# Patient Record
Sex: MALE | Race: ASIAN | NOT HISPANIC OR LATINO | URBAN - METROPOLITAN AREA
[De-identification: names, ages, dates, MRNs, and addresses within clinical notes are randomized per-mention and may not be internally consistent; named-entity substitution may affect disease eponyms.]

---

## 2020-09-19 ENCOUNTER — INPATIENT (INPATIENT)
Facility: HOSPITAL | Age: 75
LOS: 3 days | Discharge: HOME | End: 2020-09-23
Attending: STUDENT IN AN ORGANIZED HEALTH CARE EDUCATION/TRAINING PROGRAM | Admitting: STUDENT IN AN ORGANIZED HEALTH CARE EDUCATION/TRAINING PROGRAM
Payer: MEDICARE

## 2020-09-19 VITALS
TEMPERATURE: 98 F | OXYGEN SATURATION: 100 % | DIASTOLIC BLOOD PRESSURE: 117 MMHG | HEART RATE: 72 BPM | RESPIRATION RATE: 16 BRPM | SYSTOLIC BLOOD PRESSURE: 247 MMHG

## 2020-09-19 DIAGNOSIS — H54.62 UNQUALIFIED VISUAL LOSS, LEFT EYE, NORMAL VISION RIGHT EYE: ICD-10-CM

## 2020-09-19 LAB
ALBUMIN SERPL ELPH-MCNC: 4.7 G/DL — SIGNIFICANT CHANGE UP (ref 3.3–5)
ALP SERPL-CCNC: 72 U/L — SIGNIFICANT CHANGE UP (ref 40–120)
ALT FLD-CCNC: 14 U/L — SIGNIFICANT CHANGE UP (ref 4–41)
ANION GAP SERPL CALC-SCNC: 15 MMO/L — HIGH (ref 7–14)
APTT BLD: 32.7 SEC — SIGNIFICANT CHANGE UP (ref 27–36.3)
AST SERPL-CCNC: 27 U/L — SIGNIFICANT CHANGE UP (ref 4–40)
BASOPHILS # BLD AUTO: 0.09 K/UL — SIGNIFICANT CHANGE UP (ref 0–0.2)
BASOPHILS NFR BLD AUTO: 1 % — SIGNIFICANT CHANGE UP (ref 0–2)
BILIRUB SERPL-MCNC: 0.6 MG/DL — SIGNIFICANT CHANGE UP (ref 0.2–1.2)
BUN SERPL-MCNC: 21 MG/DL — SIGNIFICANT CHANGE UP (ref 7–23)
CALCIUM SERPL-MCNC: 9.9 MG/DL — SIGNIFICANT CHANGE UP (ref 8.4–10.5)
CHLORIDE SERPL-SCNC: 101 MMOL/L — SIGNIFICANT CHANGE UP (ref 98–107)
CO2 SERPL-SCNC: 24 MMOL/L — SIGNIFICANT CHANGE UP (ref 22–31)
CREAT SERPL-MCNC: 1.13 MG/DL — SIGNIFICANT CHANGE UP (ref 0.5–1.3)
CRP SERPL-MCNC: < 4 MG/L — SIGNIFICANT CHANGE UP
EOSINOPHIL # BLD AUTO: 0.07 K/UL — SIGNIFICANT CHANGE UP (ref 0–0.5)
EOSINOPHIL NFR BLD AUTO: 0.7 % — SIGNIFICANT CHANGE UP (ref 0–6)
ERYTHROCYTE [SEDIMENTATION RATE] IN BLOOD: 6 MM/HR — SIGNIFICANT CHANGE UP (ref 1–15)
GLUCOSE SERPL-MCNC: 172 MG/DL — HIGH (ref 70–99)
HCT VFR BLD CALC: 44.2 % — SIGNIFICANT CHANGE UP (ref 39–50)
HGB BLD-MCNC: 14.2 G/DL — SIGNIFICANT CHANGE UP (ref 13–17)
IMM GRANULOCYTES NFR BLD AUTO: 0.4 % — SIGNIFICANT CHANGE UP (ref 0–1.5)
INR BLD: 1.08 — SIGNIFICANT CHANGE UP (ref 0.88–1.16)
LYMPHOCYTES # BLD AUTO: 2.96 K/UL — SIGNIFICANT CHANGE UP (ref 1–3.3)
LYMPHOCYTES # BLD AUTO: 31.3 % — SIGNIFICANT CHANGE UP (ref 13–44)
MCHC RBC-ENTMCNC: 27.5 PG — SIGNIFICANT CHANGE UP (ref 27–34)
MCHC RBC-ENTMCNC: 32.1 % — SIGNIFICANT CHANGE UP (ref 32–36)
MCV RBC AUTO: 85.7 FL — SIGNIFICANT CHANGE UP (ref 80–100)
MONOCYTES # BLD AUTO: 0.88 K/UL — SIGNIFICANT CHANGE UP (ref 0–0.9)
MONOCYTES NFR BLD AUTO: 9.3 % — SIGNIFICANT CHANGE UP (ref 2–14)
NEUTROPHILS # BLD AUTO: 5.43 K/UL — SIGNIFICANT CHANGE UP (ref 1.8–7.4)
NEUTROPHILS NFR BLD AUTO: 57.3 % — SIGNIFICANT CHANGE UP (ref 43–77)
NRBC # FLD: 0 K/UL — SIGNIFICANT CHANGE UP (ref 0–0)
PLATELET # BLD AUTO: 260 K/UL — SIGNIFICANT CHANGE UP (ref 150–400)
PMV BLD: 10.4 FL — SIGNIFICANT CHANGE UP (ref 7–13)
POTASSIUM SERPL-MCNC: 4 MMOL/L — SIGNIFICANT CHANGE UP (ref 3.5–5.3)
POTASSIUM SERPL-SCNC: 4 MMOL/L — SIGNIFICANT CHANGE UP (ref 3.5–5.3)
PROT SERPL-MCNC: 7.7 G/DL — SIGNIFICANT CHANGE UP (ref 6–8.3)
PROTHROM AB SERPL-ACNC: 12.3 SEC — SIGNIFICANT CHANGE UP (ref 10.6–13.6)
RBC # BLD: 5.16 M/UL — SIGNIFICANT CHANGE UP (ref 4.2–5.8)
RBC # FLD: 13.4 % — SIGNIFICANT CHANGE UP (ref 10.3–14.5)
SODIUM SERPL-SCNC: 140 MMOL/L — SIGNIFICANT CHANGE UP (ref 135–145)
WBC # BLD: 9.47 K/UL — SIGNIFICANT CHANGE UP (ref 3.8–10.5)
WBC # FLD AUTO: 9.47 K/UL — SIGNIFICANT CHANGE UP (ref 3.8–10.5)

## 2020-09-19 PROCEDURE — 70496 CT ANGIOGRAPHY HEAD: CPT | Mod: 26

## 2020-09-19 PROCEDURE — 70498 CT ANGIOGRAPHY NECK: CPT | Mod: 26

## 2020-09-19 PROCEDURE — 99285 EMERGENCY DEPT VISIT HI MDM: CPT

## 2020-09-19 RX ORDER — LABETALOL HCL 100 MG
100 TABLET ORAL ONCE
Refills: 0 | Status: COMPLETED | OUTPATIENT
Start: 2020-09-19 | End: 2020-09-19

## 2020-09-19 RX ORDER — LABETALOL HCL 100 MG
10 TABLET ORAL ONCE
Refills: 0 | Status: COMPLETED | OUTPATIENT
Start: 2020-09-19 | End: 2020-09-19

## 2020-09-19 RX ADMIN — Medication 100 MILLIGRAM(S): at 18:06

## 2020-09-19 RX ADMIN — Medication 10 MILLIGRAM(S): at 17:15

## 2020-09-19 NOTE — CONSULT NOTE ADULT - ASSESSMENT
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    1. CRAO Left Eye  -Patient presented with painless unilateral acute vision loss seen to have ischemic posterior pole with cherry red spot  -BP noted to be very elevated (systolic ~220s/100s) in the ED.  -ESR, CRP, plt ruled out GCA. Pt has no GCA Sx (no fever, joint pain, temporal scalp sensitivity, jaw claudication, new weight loss, joint pain).  -Stroke work-up: A1c, duplex Doppler US, echo. Monitor on telemetry.  -Neurology on board. MRI brain without contrast ordered. ASA 81 mg PO daily started.     Outpatient Follow-up: Patient should follow-up with his/her ophthalmologist or with [facility] of Ophthalmology within 1 week of after discharge at:    [address]  [place] [address]  [phone]    Jesus Tony MD, PGY-2  Pager: [phone]  Also available on [employer] Teams      Seen and discussed with Dr. Tk Cardona, PGY4. 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    1. CRAO Left Eye  -Patient presented with painless unilateral acute vision loss seen to have ischemic posterior pole with cherry red spot  -BP noted to be very elevated (systolic ~220s/100s) in the ED.  -ESR, CRP, plt ruled out GCA. Pt has no GCA Sx (no fever, joint pain, temporal scalp sensitivity, jaw claudication, new weight loss, joint pain).  -Stroke work-up: A1c, duplex Doppler US, echo. Monitor on telemetry.  -Neurology on board. MRI brain without contrast ordered. ASA 81 mg PO daily started.     2. Glaucoma Both Eyes  -Patient noted with large cup to disc ratios in both optic nerves (0.9 right, 0.8 left) likely indicating severe glaucoma in both eyes, although intraocular pressures were normal.  -Patient told to follow up with ophthalmologist after discharge from hospital for further work-up.    Outpatient Follow-up: Patient should follow-up with his/her ophthalmologist or with [facility] Department of Ophthalmology within 1 week of after discharge at:    [address]  [place] [address]  [phone]    Jesus Tony MD, PGY-2  Pager: [phone]  Also available on [employer] Teams      Seen and discussed with Dr. Tk Cardona, PGY4.

## 2020-09-19 NOTE — CONSULT NOTE ADULT - ATTENDING COMMENTS
agree with above.  2 day hx of vision loss on arrival. seen by ophthalmology c/w  CRAO     seen and examined with team. exam c/w central vision loss b/l   ct cta without acute process  mri pending   risk factor modification   pt/ot eval   denies any visual  hallucinations.   tele monitoring to r/o embolic source  f/u with ophtho on discharge

## 2020-09-19 NOTE — CONSULT NOTE ADULT - ASSESSMENT
Assessment: 75 year old male, PMH DM, HTN, R eye partial vision loss, coming in for new-onset L eye vision loss. According to the patient, he was watching TV 2 days ago Thursday 9/17 at approximately 8pm. During this time, he suddenly lost sight in his L eye. He states he is able to make out subtle shapes while denying complete vision loss. The L eye vision loss is described painless. Patient did not go to his regular ophthalmologist until today because he was not able to get an appointment sooner. The ophthalmologist saw findings suspicious for a CRAO and advised the patient to go to the ED for a full stroke workup. Patient denies HA, trouble swallowing/speech, numbness/tingling/weakness, balance abnormalities. Exam revealed decreased VF in both eyes but was able to make out the my fingers when held close to his L eye. Pupils were dilated 5-6mm, though likely from dilation from ophtho workup.     Impression: Sudden L eye vision loss may be 2/2 CRAO, which was of strong suspicion from his outside ophthalmologist. Ophtho has been consulted and will follow their recs, while at the same time conduct a full stroke workup, while inpatient, to rule out acute infarct as another possible etiology.      Plan:  []MRI brain w/o contrast  []Can start ASA 81mg PO daily  []Telemetry monitoring  []TTE  []Further workup pending MRI brain results  []Appreciate ophthalmology recs       -Management & disposition discussed with neuro attending, Dr. Avila Assessment: 75 year old male, PMH DM, HTN, R eye partial vision loss, coming in for new-onset L eye vision loss. According to the patient, he was watching TV 2 days ago Thursday 9/17 at approximately 8pm. During this time, he suddenly lost sight in his L eye. He states he is able to make out subtle shapes while denying complete vision loss. The L eye vision loss is described painless. Patient did not go to his regular ophthalmologist until today because he was not able to get an appointment sooner. The ophthalmologist saw findings suspicious for a CRAO and advised the patient to go to the ED for a full stroke workup. Patient denies HA, trouble swallowing/speech, numbness/tingling/weakness, balance abnormalities. Exam revealed decreased VF in both eyes but was able to make out the my fingers when held close to his L eye. Pupils were dilated 5-6mm, though likely from dilation from ophtho workup.     Impression: Sudden L eye vision loss may be 2/2 CRAO, which was of strong suspicion from his outside ophthalmologist according to the ED. Ophtho has been consulted and will follow their recs, while at the same time conduct a full stroke workup, while inpatient, to rule out acute infarct as another possible etiology.      Plan:  []MRI brain w/o contrast  []Can start ASA 81mg PO daily  []Telemetry monitoring  []TTE  []Further workup pending MRI brain results  []Appreciate ophthalmology recs       -Management & disposition discussed with neuro attending, Dr. Avila Assessment: 75 year old male, PMH DM, HTN, 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 According to the patient, he was watching TV 2 days ago Thursday 9/17 at approximately 8pm. During this time, he suddenly lost sight in his L eye. He states he is able to make out subtle shapes while denying complete vision loss. The L eye vision loss is described painless. Patient did not go to his regular ophthalmologist until today because he was not able to get an appointment sooner. The ophthalmologist saw findings suspicious for a CRAO and advised the patient to go to the ED for a full stroke workup. Patient denies HA, trouble swallowing/speech, numbness/tingling/weakness, balance abnormalities. Exam revealed decreased VF in both eyes but was able to make out the my fingers when held close to his L eye. Pupils were dilated 5-6mm, though likely from dilation from ophtho workup.     Impression: Sudden L eye vision loss may be 2/2 CRAO, which was of strong suspicion from his outside ophthalmologist according to the ED. Ophtho has been consulted and will follow their recs, while at the same time conduct a full stroke workup, while inpatient, to rule out acute infarct as another possible etiology.      Plan:  []MRI brain w/o contrast  []Can start ASA 81mg PO daily  []Telemetry monitoring  []TTE  []Further workup pending MRI brain results  []Appreciate ophthalmology recs       -Management & disposition discussed with neuro attending, Dr. Avila Assessment: 75 year old male, PMH DM, HTN, 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 According to the patient, he was watching TV 2 days ago Thursday 9/17 at approximately 8pm. During this time, he suddenly lost sight in his L eye. He states he is able to make out subtle shapes while denying complete vision loss. The L eye vision loss is described painless. Patient did not go to his regular ophthalmologist until today because he was not able to get an appointment sooner. The ophthalmologist saw findings suspicious for a CRAO and advised the patient to go to the ED for a full stroke workup. Patient denies HA, trouble swallowing/speech, numbness/tingling/weakness, balance abnormalities. Exam revealed decreased VF in both eyes but was able to make out the my fingers when held close to his L eye. Pupils were dilated 5-6mm, though likely from dilation from ophtho workup. CT imaging negative for bleed, mass effect, acute infarct, vessel occlusion/stenosis.    Impression: Sudden L eye vision loss may be 2/2 CRAO, which was of strong suspicion from his outside ophthalmologist according to the ED. Ophtho has been consulted and will follow their recs, while at the same time conduct a full stroke workup, while inpatient, to rule out acute infarct as another possible etiology.      Plan:  []MRI brain w/o contrast  []Can start ASA 81mg PO daily  []Telemetry monitoring  []TTE  []Further workup pending MRI brain results  []Appreciate ophthalmology recs       -Management & disposition discussed with neuro attending, Dr. Avila Assessment: 75 year old male, PMH DM, HTN, 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 According to the patient, he was watching TV 2 days ago Thursday 9/17 at approximately 8pm. During this time, he suddenly lost sight in his L eye. He states he is able to make out subtle shapes while denying complete vision loss. The L eye vision loss is described painless. Patient did not go to his regular ophthalmologist until today because he was not able to get an appointment sooner. The ophthalmologist saw findings suspicious for a CRAO and advised the patient to go to the ED for a full stroke workup. Patient denies HA, trouble swallowing/speech, numbness/tingling/weakness, balance abnormalities. Exam revealed decreased VF in both eyes but was able to make out the my fingers when held close to his L eye. Pupils were dilated 5-6mm, though likely from dilation from ophtho workup. CT imaging negative for bleed, mass effect, acute infarct, vessel occlusion/stenosis.    Impression: Sudden L eye vision loss likely 2/2 CRAO per optho, which was of strong suspicion from his outside ophthalmologist according to the ED. Ophtho has been consulted and will follow their recs, while at the same time conduct a full stroke workup, while inpatient, to rule out acute infarct as another possible etiology.      Plan:  []MRI brain w/o contrast  []Can start ASA 81mg PO daily  []Telemetry monitoring  []TTE  []Further workup pending MRI brain results  []Appreciate ophthalmology recs   [] lipid panel consider lipitor 40 mg       -Management & disposition discussed with neuro attending, Dr. Avila

## 2020-09-19 NOTE — CONSULT NOTE ADULT - SUBJECTIVE AND OBJECTIVE BOX
HPI: 75 year old male, PMH DM, HTN, R eye partial vision loss, coming in for new-onset L eye vision loss. According to the patient, he was watching TV 2 days ago Thursday 9/17 at approximately 8pm. During this time, he suddenly lost sight in his L eye. He states he is able to make out subtle shapes while denying complete vision loss. The L eye vision loss is described painless. Patient did not go to his regular ophthalmologist until today because he was not able to get an appointment sooner. The ophthalmologist saw findings suspicious for a CRAO and advised the patient to go to the ED for a full stroke workup. Patient denies HA, trouble swallowing/speech, numbness/tingling/weakness, balance abnormalities.     (Stroke only)  NIHSS: 3   pre-MRS: 0    REVIEW OF SYSTEMS  As per HPI    PAST MEDICAL & SURGICAL HISTORY:  DM2 (diabetes mellitus, type 2)    HTN (hypertension)    No significant past surgical history      FAMILY HISTORY:    SOCIAL HISTORY:   T/E/D:   Occupation:   Lives with:     MEDICATIONS (HOME):  Home Medications:    MEDICATIONS  (STANDING):    MEDICATIONS  (PRN):    ALLERGIES/INTOLERANCES:  Allergies  No Known Allergies    Intolerances    VITALS & EXAMINATION:  Vital Signs Last 24 Hrs  T(C): 36.8 (19 Sep 2020 19:18), Max: 36.8 (19 Sep 2020 19:18)  T(F): 98.3 (19 Sep 2020 19:18), Max: 98.3 (19 Sep 2020 19:18)  HR: 75 (19 Sep 2020 19:18) (71 - 75)  BP: 210/110 (19 Sep 2020 19:18) (210/110 - 251/123)  BP(mean): --  RR: 18 (19 Sep 2020 19:18) (15 - 18)  SpO2: 99% (19 Sep 2020 19:18) (98% - 100%)    General:  Constitutional: Male, appears stated age, in no apparent distress  Head: Normocephalic & atraumatic.    Neurological (>12):  MS: Awake, alert, oriented to person, place, situation, time. Normal affect. Follows all commands.    Language: Speech is clear, fluent with good repetition & comprehension. Able to make the shape of my pen.    CNs: Pupils dilated 5-6mm, likely from ophtho evaluation. VF not intact, able to make out fingers when held close to eye. EOMI when following my pen, no nystagmus. V1-3 intact to LT, well developed masseter muscles b/l. No facial asymmetry b/l, full eye closure strength b/l. Hearing grossly normal (rubbing fingers) b/l. Symmetric palate elevation in midline. Gag reflex deferred. Head turning & shoulder shrug intact b/l. Tongue midline, normal movements, no atrophy.    Motor: Normal muscle bulk & tone. No noticeable tremor or seizure. No pronator drift.              Deltoid	Biceps	   R	5	5	5		  L	5	5	5			    	H-Flex	H-ABd   D-Flex	P-Flex  R	5	5	5	5		   L	5	5	5	5		     Sensation: Intact to LT b/l throughout.     Cortical: Extinction on DSS (neglect): none    Coordination: intact rapid-alt movements. No dysmetria to FTN/HTS    Gait: No postural instability.     LABORATORY:  CBC                       14.2   9.47  )-----------( 260      ( 19 Sep 2020 17:10 )             44.2     Chem 09-19    140  |  101  |  21  ----------------------------<  172<H>  4.0   |  24  |  1.13    Ca    9.9      19 Sep 2020 17:10    TPro  7.7  /  Alb  4.7  /  TBili  0.6  /  DBili  x   /  AST  27  /  ALT  14  /  AlkPhos  72  09-19    LFTs LIVER FUNCTIONS - ( 19 Sep 2020 17:10 )  Alb: 4.7 g/dL / Pro: 7.7 g/dL / ALK PHOS: 72 u/L / ALT: 14 u/L / AST: 27 u/L / GGT: x           Coagulopathy PT/INR - ( 19 Sep 2020 17:10 )   PT: 12.3 SEC;   INR: 1.08          PTT - ( 19 Sep 2020 17:10 )  PTT:32.7 SEC  Lipid Panel   A1c   Cardiac enzymes     U/A   CSF  Immunological  Other    STUDIES & IMAGING:  Radiology (XR, CT, MR, U/S, TTE/DARLYN): HPI: 75 year old male, PMH DM, HTN, R eye partial vision loss, coming in for new-onset L eye vision loss. According to the patient, he was watching TV 2 days ago Thursday 9/17 at approximately 8pm. During this time, he suddenly lost sight in his L eye. He states he is able to make out subtle shapes while denying complete vision loss. The L eye vision loss is described painless. Patient did not go to his regular ophthalmologist until today because he was not able to get an appointment sooner. The ophthalmologist saw findings suspicious for a CRAO, according to ED staff, and advised the patient to go to the ED for a full stroke workup. Patient denies HA, trouble swallowing/speech, numbness/tingling/weakness, balance abnormalities.     (Stroke only)  NIHSS: 3   pre-MRS: 0    REVIEW OF SYSTEMS  As per HPI    PAST MEDICAL & SURGICAL HISTORY:  DM2 (diabetes mellitus, type 2)    HTN (hypertension)    No significant past surgical history      FAMILY HISTORY:    SOCIAL HISTORY:   T/E/D:   Occupation:   Lives with:     MEDICATIONS (HOME):  Home Medications:    MEDICATIONS  (STANDING):    MEDICATIONS  (PRN):    ALLERGIES/INTOLERANCES:  Allergies  No Known Allergies    Intolerances    VITALS & EXAMINATION:  Vital Signs Last 24 Hrs  T(C): 36.8 (19 Sep 2020 19:18), Max: 36.8 (19 Sep 2020 19:18)  T(F): 98.3 (19 Sep 2020 19:18), Max: 98.3 (19 Sep 2020 19:18)  HR: 75 (19 Sep 2020 19:18) (71 - 75)  BP: 210/110 (19 Sep 2020 19:18) (210/110 - 251/123)  BP(mean): --  RR: 18 (19 Sep 2020 19:18) (15 - 18)  SpO2: 99% (19 Sep 2020 19:18) (98% - 100%)    General:  Constitutional: Male, appears stated age, in no apparent distress  Head: Normocephalic & atraumatic.    Neurological (>12):  MS: Awake, alert, oriented to person, place, situation, time. Normal affect. Follows all commands.    Language: Speech is clear, fluent with good repetition & comprehension. Able to make the shape of my pen.    CNs: Pupils dilated 5-6mm, likely from ophtho evaluation. VF not intact, able to make out fingers when held close to L and R eye separately. EOMI when following my pen, no nystagmus. V1-3 intact to LT, well developed masseter muscles b/l. No facial asymmetry b/l, full eye closure strength b/l. Hearing grossly normal (rubbing fingers) b/l. Symmetric palate elevation in midline. Gag reflex deferred. Head turning & shoulder shrug intact b/l. Tongue midline, normal movements, no atrophy.    Motor: Normal muscle bulk & tone. No noticeable tremor or seizure. No pronator drift.              Deltoid	Biceps	   R	5	5	5		  L	5	5	5			    	H-Flex	H-ABd   D-Flex	P-Flex  R	5	5	5	5		   L	5	5	5	5		     Sensation: Intact to LT b/l throughout.     Cortical: Extinction on DSS (neglect): none    Coordination: intact rapid-alt movements. No dysmetria to FTN/HTS    Gait: No postural instability.     LABORATORY:  CBC                       14.2   9.47  )-----------( 260      ( 19 Sep 2020 17:10 )             44.2     Chem 09-19    140  |  101  |  21  ----------------------------<  172<H>  4.0   |  24  |  1.13    Ca    9.9      19 Sep 2020 17:10    TPro  7.7  /  Alb  4.7  /  TBili  0.6  /  DBili  x   /  AST  27  /  ALT  14  /  AlkPhos  72  09-19    LFTs LIVER FUNCTIONS - ( 19 Sep 2020 17:10 )  Alb: 4.7 g/dL / Pro: 7.7 g/dL / ALK PHOS: 72 u/L / ALT: 14 u/L / AST: 27 u/L / GGT: x           Coagulopathy PT/INR - ( 19 Sep 2020 17:10 )   PT: 12.3 SEC;   INR: 1.08          PTT - ( 19 Sep 2020 17:10 )  PTT:32.7 SEC  Lipid Panel   A1c   Cardiac enzymes     U/A   CSF  Immunological  Other    STUDIES & IMAGING:  Radiology (XR, CT, MR, U/S, TTE/DARLYN): HPI: 75 year old male, PMH DM, HTN, 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 According to the patient, he was watching TV 2 days ago Thursday 9/17 at approximately 8pm. During this time, he suddenly lost sight in his L eye. He states he is able to make out subtle shapes while denying complete vision loss. The L eye vision loss is described painless. Patient did not go to his regular ophthalmologist until today because he was not able to get an appointment sooner. The ophthalmologist saw findings suspicious for a CRAO, according to ED staff, and advised the patient to go to the ED for a full stroke workup. Patient denies HA, trouble swallowing/speech, numbness/tingling/weakness, balance abnormalities.     (Stroke only)  NIHSS: 3   pre-MRS: 0    REVIEW OF SYSTEMS  As per HPI    PAST MEDICAL & SURGICAL HISTORY:  DM2 (diabetes mellitus, type 2)    HTN (hypertension)    No significant past surgical history      FAMILY HISTORY:    SOCIAL HISTORY:   T/E/D:   Occupation:   Lives with:     MEDICATIONS (HOME):  Home Medications:    MEDICATIONS  (STANDING):    MEDICATIONS  (PRN):    ALLERGIES/INTOLERANCES:  Allergies  No Known Allergies    Intolerances    VITALS & EXAMINATION:  Vital Signs Last 24 Hrs  T(C): 36.8 (19 Sep 2020 19:18), Max: 36.8 (19 Sep 2020 19:18)  T(F): 98.3 (19 Sep 2020 19:18), Max: 98.3 (19 Sep 2020 19:18)  HR: 75 (19 Sep 2020 19:18) (71 - 75)  BP: 210/110 (19 Sep 2020 19:18) (210/110 - 251/123)  BP(mean): --  RR: 18 (19 Sep 2020 19:18) (15 - 18)  SpO2: 99% (19 Sep 2020 19:18) (98% - 100%)    General:  Constitutional: Male, appears stated age, in no apparent distress  Head: Normocephalic & atraumatic.    Neurological (>12):  MS: Awake, alert, oriented to person, place, situation, time. Normal affect. Follows all commands.    Language: Speech is clear, fluent with good repetition & comprehension. Able to make the shape of my pen.    CNs: Pupils dilated 5-6mm, likely from ophtho evaluation. VF not intact, able to make out fingers when held close to L and R eye separately. EOMI when following my pen, no nystagmus. V1-3 intact to LT, well developed masseter muscles b/l. No facial asymmetry b/l, full eye closure strength b/l. Hearing grossly normal (rubbing fingers) b/l. Symmetric palate elevation in midline. Gag reflex deferred. Head turning & shoulder shrug intact b/l. Tongue midline, normal movements, no atrophy.    Motor: Normal muscle bulk & tone. No noticeable tremor or seizure. No pronator drift.              Deltoid	Biceps	   R	5	5	5		  L	5	5	5			    	H-Flex	H-ABd   D-Flex	P-Flex  R	5	5	5	5		   L	5	5	5	5		     Sensation: Intact to LT b/l throughout.     Cortical: Extinction on DSS (neglect): none    Coordination: intact rapid-alt movements. No dysmetria to FTN/HTS    Gait: No postural instability.     LABORATORY:  CBC                       14.2   9.47  )-----------( 260      ( 19 Sep 2020 17:10 )             44.2     Chem 09-19    140  |  101  |  21  ----------------------------<  172<H>  4.0   |  24  |  1.13    Ca    9.9      19 Sep 2020 17:10    TPro  7.7  /  Alb  4.7  /  TBili  0.6  /  DBili  x   /  AST  27  /  ALT  14  /  AlkPhos  72  09-19    LFTs LIVER FUNCTIONS - ( 19 Sep 2020 17:10 )  Alb: 4.7 g/dL / Pro: 7.7 g/dL / ALK PHOS: 72 u/L / ALT: 14 u/L / AST: 27 u/L / GGT: x           Coagulopathy PT/INR - ( 19 Sep 2020 17:10 )   PT: 12.3 SEC;   INR: 1.08          PTT - ( 19 Sep 2020 17:10 )  PTT:32.7 SEC  Lipid Panel   A1c   Cardiac enzymes     U/A   CSF  Immunological  Other    STUDIES & IMAGING:  Radiology (XR, CT, MR, U/S, TTE/DARLYN): HPI: 75 year old male, PMH DM, HTN, 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 According to the patient, he was watching TV 2 days ago Thursday 9/17 at approximately 8pm. During this time, he suddenly lost sight in his L eye. He states he is able to make out subtle shapes while denying complete vision loss. The L eye vision loss is described painless. Patient did not go to his regular ophthalmologist until today because he was not able to get an appointment sooner. The ophthalmologist saw findings suspicious for a CRAO, according to ED staff, and advised the patient to go to the ED for a full stroke workup. Patient denies HA, trouble swallowing/speech, numbness/tingling/weakness, balance abnormalities.     (Stroke only)  NIHSS: 3   pre-MRS: 0    REVIEW OF SYSTEMS  As per HPI    PAST MEDICAL & SURGICAL HISTORY:  DM2 (diabetes mellitus, type 2)    HTN (hypertension)    No significant past surgical history      FAMILY HISTORY:    SOCIAL HISTORY:   T/E/D:   Occupation:   Lives with:     MEDICATIONS (HOME):  Home Medications:    MEDICATIONS  (STANDING):    MEDICATIONS  (PRN):    ALLERGIES/INTOLERANCES:  Allergies  No Known Allergies    Intolerances    VITALS & EXAMINATION:  Vital Signs Last 24 Hrs  T(C): 36.8 (19 Sep 2020 19:18), Max: 36.8 (19 Sep 2020 19:18)  T(F): 98.3 (19 Sep 2020 19:18), Max: 98.3 (19 Sep 2020 19:18)  HR: 75 (19 Sep 2020 19:18) (71 - 75)  BP: 210/110 (19 Sep 2020 19:18) (210/110 - 251/123)  BP(mean): --  RR: 18 (19 Sep 2020 19:18) (15 - 18)  SpO2: 99% (19 Sep 2020 19:18) (98% - 100%)    General:  Constitutional: Male, appears stated age, in no apparent distress  Head: Normocephalic & atraumatic.    Neurological (>12):  MS: Awake, alert, oriented to person, place, situation, time. Normal affect. Follows all commands.    Language: Speech is clear, fluent with good repetition & comprehension. Able to make the shape of my pen.    CNs: Pupils dilated 5-6mm, likely from ophtho evaluation. VF not intact, able to make out fingers when held close to L and R eye separately. EOMI when following my pen, no nystagmus. V1-3 intact to LT, well developed masseter muscles b/l. No facial asymmetry b/l, full eye closure strength b/l. Hearing grossly normal (rubbing fingers) b/l. Symmetric palate elevation in midline. Gag reflex deferred. Head turning & shoulder shrug intact b/l. Tongue midline, normal movements, no atrophy.    Motor: Normal muscle bulk & tone. No noticeable tremor or seizure. No pronator drift.              Deltoid	Biceps	   R	5	5	5		  L	5	5	5			    	H-Flex	H-ABd   D-Flex	P-Flex  R	5	5	5	5		   L	5	5	5	5		     Sensation: Intact to LT b/l throughout.     Cortical: Extinction on DSS (neglect): none    Coordination: intact rapid-alt movements. No dysmetria to FTN/HTS    Gait: No postural instability.     LABORATORY:  CBC                       14.2   9.47  )-----------( 260      ( 19 Sep 2020 17:10 )             44.2     Chem 09-19    140  |  101  |  21  ----------------------------<  172<H>  4.0   |  24  |  1.13    Ca    9.9      19 Sep 2020 17:10    TPro  7.7  /  Alb  4.7  /  TBili  0.6  /  DBili  x   /  AST  27  /  ALT  14  /  AlkPhos  72  09-19    LFTs LIVER FUNCTIONS - ( 19 Sep 2020 17:10 )  Alb: 4.7 g/dL / Pro: 7.7 g/dL / ALK PHOS: 72 u/L / ALT: 14 u/L / AST: 27 u/L / GGT: x           Coagulopathy PT/INR - ( 19 Sep 2020 17:10 )   PT: 12.3 SEC;   INR: 1.08          PTT - ( 19 Sep 2020 17:10 )  PTT:32.7 SEC  Lipid Panel   A1c   Cardiac enzymes     U/A   CSF  Immunological  Other    STUDIES & IMAGING:  Radiology (XR, CT, MR, U/S, TTE/DARLYN):  CT HEAD: Mildly motion degraded. No gross acute intracranial hemorrhage or mass effect from vasogenic edema. Mild chronic microvascular changes.     CTA NECK: No stenosis of the cervical carotid arteries based on NASCET criteria. Patent cervical vertebral arteries. No evidence of dissection.     CTA HEAD: No high-grade stenosis or occlusion of the major proximal arterial branches. No evidence of intracranial dissection or AVM.

## 2020-09-19 NOTE — ED ADULT NURSE NOTE - OBJECTIVE STATEMENT
Malika RN: 74 yo male, with hx of DM, HTN, HLD, vision impairment to R eye x 5 years, c/o sudden onset of vision loss to L eye 2 days ago. pt reports "I was sitting down watching TV and my vision went black". pt reports seeing ophthalmologist this afternoon and was told "you have a blocked artery behind your eye". pt states he can see shapes, but unable to read or watch tv. no facial drift, arm drift present. equal  strength. facial sensation present/equal. pt denies headache, dizziness, weakness, chest pain, SOB, n/v/d, numbness/tingling to extremities. 20g IV insert to R.AC. handoff report given to primary RN

## 2020-09-19 NOTE — ED ADULT NURSE REASSESSMENT NOTE - NS ED NURSE REASSESS COMMENT FT1
Spoke to Tele PA in regards to patient Diet order, dysphagia screening in flowsheet, awaiting diet order to be placed. Report given to [facility] 2 RN. Patient in no acute distress, respirations even and unlabored at time of transport.

## 2020-09-19 NOTE — ED PROVIDER NOTE - PHYSICAL EXAMINATION
Gen: AAOx3, non-toxic  Head: NCAT  HEENT: EOMI, PERRLA, oral mucosa moist, normal conjunctiva  Lung: CTAB, no respiratory distress, no wheezes/rhonchi/rales B/L, speaking in full sentences  CV: RRR, no murmurs, rubs or gallops  Abd: soft, NTND, no guarding, no CVA tenderness, no rebound tenderness  MSK: no visible deformities, full range of motion of all 4 exts  Neuro: No focal sensory or motor deficits  Skin: Warm, well perfused, no rash  Psych: normal affect.   ~James Garcia MD

## 2020-09-19 NOTE — ED PROVIDER NOTE - ATTENDING CONTRIBUTION TO CARE
Jose SIU: I agree with the above provided history and exam and addend/modify it as follows.    75M w/ pmh HTN, HLD, DM - sent by ophthalmologist for left eye blindness x 2 days, was told had "blocked artery" in eye after thorough outpatient evaluation. Patient notes sx began 2 days ago while watching TV, acutely, and has been persistent since hten. Patient notes that he is able to see light and shapes through left eye, but that is it. 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  Denies any sort of eye pain at any point the last few days. Patient denies any other complaints. No fever, n/v/d/c, chest / abd pain, cough, sob, dizziness, dysuria/hematuria. No recent travel, medication change, illness, or hospitalization. Patient noted to be markedly hypertensive at triage.    On exam vision worse than 20/200 bilaterally, able to see hand waving movements but unable to count fingers. Patient has otherwise normal neuro exam, no nystagmus, EOMI intact bilat, full strength across bilat UE and LE.     Plan for labs, esr/crp, d/w ophtho for advice on imaging, management - will likely admit for further workup. Low suspicion for ICH/CVA given rest of neuro exam normal, also low suspicion for acute glaucoma, other intraocular pathology.    I Azael Garcia MD performed a history and physical exam of the patient and discussed their management with the resident and /or advanced care provider. I reviewed the resident and /or ACP's note and agree with the documented findings and plan of care. My medical decision making and observations are found above.

## 2020-09-19 NOTE — ED ADULT NURSE REASSESSMENT NOTE - NS ED NURSE REASSESS COMMENT FT1
Received report from ADELE Izaguirre. Patient A&oX4, vital signs as noted. Patient denies any pain or discomfort at this time. Patient in no acute distress, respirations even and unlabored. Patient given warm blanket for comfort. Safety maintained. Call bell within reach. Will continue to monitor.

## 2020-09-19 NOTE — ED ADULT TRIAGE NOTE - CHIEF COMPLAINT QUOTE
pt sent in by opthomalogist for left eye blindness x2 days. PMH DM, HTN- compliant with medication. Pt hypertensive at 247/117 in triage. Denies CP, SOB, N/V, H/A, fever. Wife can be reached at [phone]

## 2020-09-19 NOTE — ED PROVIDER NOTE - CLINICAL SUMMARY MEDICAL DECISION MAKING FREE TEXT BOX
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

## 2020-09-19 NOTE — ED PROVIDER NOTE - CHIEF COMPLAINT
Panel Management Review      Patient has the following on her problem list: None      Composite cancer screening  Chart review shows that this patient is due/due soon for the following Colonoscopy or Fecal Colorectal (FIT)  Summary:    Patient is due/failing the following:     Health Maintenance Due   Topic Date Due     COLONOSCOPY  09/30/1977     ZOSTER IMMUNIZATION (1 of 2) 09/30/2017     PHQ-2  01/01/2019     HPV  01/19/2020     PAP  01/19/2020         Action needed:   Schedule colonoscopy (ordered 10/4/18) or complete FIT.     Type of outreach:      Phone, spoke to patient.  She would like to do FIT test. I will place a kit at  for her to  when convenient and place the order for her.    Questions for provider review:    None                                                                                                                                    April GUS Moss     Chart routed to none .           The patient is a 75y Male complaining of

## 2020-09-19 NOTE — CONSULT NOTE ADULT - SUBJECTIVE AND OBJECTIVE BOX
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 Patient reports that on Thursday, he suddenly noticed extremely blurry vision in the left eye at 8:00pm, while sitting down to watch TV. Patient saw his eye doctor today in [place] and was told to be evaluated in the emergency department for a possible CRAO of the left eye. Denies any fever, eye pain, headache, jaw claudication, nausea, vomiting, weight loss, new onset joint pain.     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 Former psychiatrist.   Outpatient Ophthalmologist: [place] ([address]) doctor. Pt does not remember name.      Review of Systems:  Constitutional: No fever, chills  Eyes: Admits to vision loss recently in left eye, chronic vision loss in right eye. No flashes, floaters, FBS, erythema, discharge, double vision, OU  Neuro: No tremors  Cardiovascular: No chest pain, palpitations  Respiratory: No SOB, no cough  GI: No nausea, vomiting, abdominal pain    Vital Signs: T(C): 36.8 (09-19-20 @ 19:18)  T(F): 98.3 (09-19-20 @ 19:18), Max: 98.3 (09-19-20 @ 19:18)  HR: 75 (09-19-20 @ 19:18) (71 - 75)  BP: 210/110 (09-19-20 @ 19:18) (210/110 - 251/123)  RR:  (15 - 18)  SpO2:  (98% - 100%)  Wt(kg): --  General: AAO x 3, appropriate mood and affect    Ophthalmology Exam:  Visual acuity (cc): CF 3 feet, CF 2 feet  Pupils: Dilated, minimally reactive OU  Tonometry: 16, 15  Extraocular movements (EOMs): Full OU, no pain, no diplopia.  Confrontational Visual Field (CVF): Full OU    Pen Light Exam (PLE)  External: Normal OU.  Lids/Lashes/Lacrimal Ducts: Flat OU.  Sclera/Conjunctiva: White and quiet OU.  Cornea: Clear OU.  Anterior Chamber: Deep and formed OU.    Iris: Flat OU.  Lens: 1-2+ NS    Fundus Exam: dilated with 1% tropicamide and 2.5% phenylephrine  Approval obtained from primary team for dilation  Patient aware that pupils can remained dilated for at least 4-6 hours.  Exam performed with 20 D lens    Vitreous: wnl OU  Disc, cup/disc: OD: Pale, 0.9. OS: 0.8  Macula: OD: Macula wnl. OS: Hector, ischemic looking macula. Cherry red spot  Vessels: wnl OU  Periphery: Superficial whitening of posterior pole. DBH noted OU    Labs/Imaging:  ESR 6  CRP < 4.0     [facility] DEPARTMENT OF OPHTHALMOLOGY - INITIAL ADULT CONSULT  -----------------------------------------------------------------------------------------------------------------  Jesus Tony MD PGY 2  Pager: [phone]  -----------------------------------------------------------------------------------------------------------------    HPI: Jourdan Lynn is a 76 y/o male with PMH of T2DM, HLD, chronic right eye vision loss (from diabetes) sent to the ED from ophthalmologist for workup for CRAO of the left eye. Patient reports that on Thursday, he suddenly noticed extremely blurry vision in the left eye at 8:00pm, while sitting down to watch TV. Patient saw his eye doctor today in [place] and was told to be evaluated in the emergency department for a possible CRAO of the left eye. Denies any fever, eye pain, headache, jaw claudication, nausea, vomiting, weight loss, new onset joint pain.     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 Former psychiatrist.   Outpatient Ophthalmologist: [place] ([address]) doctor. Pt does not remember name.      Review of Systems:  Constitutional: No fever, chills  Eyes: Admits to vision loss recently in left eye, chronic vision loss in right eye. No flashes, floaters, FBS, erythema, discharge, double vision, OU  Neuro: No tremors  Cardiovascular: No chest pain, palpitations  Respiratory: No SOB, no cough  GI: No nausea, vomiting, abdominal pain    Vital Signs: T(C): 36.8 (09-19-20 @ 19:18)  T(F): 98.3 (09-19-20 @ 19:18), Max: 98.3 (09-19-20 @ 19:18)  HR: 75 (09-19-20 @ 19:18) (71 - 75)  BP: 210/110 (09-19-20 @ 19:18) (210/110 - 251/123)  RR:  (15 - 18)  SpO2:  (98% - 100%)  Wt(kg): --  General: AAO x 3, appropriate mood and affect    Ophthalmology Exam:  Visual acuity (cc): CF 3 feet, CF 2 feet  Pupils: Dilated, minimally reactive OU  Tonometry: 16, 15  Extraocular movements (EOMs): Full OU, no pain, no diplopia.    Pen Light Exam (PLE)  External: Normal OU.  Lids/Lashes/Lacrimal Ducts: Flat OU.  Sclera/Conjunctiva: White and quiet OU.  Cornea: Clear OU.  Anterior Chamber: Deep and formed OU.    Iris: Flat OU.  Lens: 1-2+ NS    Fundus Exam: dilated with 1% tropicamide and 2.5% phenylephrine  Approval obtained from primary team for dilation  Patient aware that pupils can remained dilated for at least 4-6 hours.  Exam performed with 20 D lens    Vitreous: wnl OU  Disc, cup/disc: OD: Pale, 0.9. OS: 0.8  Macula: OD: Macula wnl. OS: Hector, ischemic looking macula. Cherry red spot  Vessels: wnl OU  Periphery: Superficial whitening of posterior pole. DBH noted OU    Labs/Imaging:  ESR 6  CRP < 4.0     [facility] DEPARTMENT OF OPHTHALMOLOGY - INITIAL ADULT CONSULT  -----------------------------------------------------------------------------------------------------------------  Jesus Tony MD PGY 2  Pager: [phone]  -----------------------------------------------------------------------------------------------------------------    HPI: Jourdan Lynn is a 76 y/o male with PMH of T2DM, HLD, chronic right eye vision loss (from diabetes) sent to the ED from ophthalmologist for workup for CRAO of the left eye. Patient reports that on Thursday, he suddenly noticed extremely blurry vision in the left eye at 8:00pm, while sitting down to watch TV. Patient saw his eye doctor today in [place] and was told to be evaluated in the emergency department for a possible CRAO of the left eye. Denies any fever, eye pain, headache, jaw claudication, nausea, vomiting, weight loss, new onset joint pain.     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 Former psychiatrist.   Outpatient Ophthalmologist: [place] ([address]) doctor. Pt does not remember name.      Review of Systems:  Constitutional: No fever, chills  Eyes: Admits to vision loss recently in left eye, chronic vision loss in right eye. No flashes, floaters, FBS, erythema, discharge, double vision, OU  Neuro: No tremors  Cardiovascular: No chest pain, palpitations  Respiratory: No SOB, no cough  GI: No nausea, vomiting, abdominal pain    Vital Signs: T(C): 36.8 (09-19-20 @ 19:18)  T(F): 98.3 (09-19-20 @ 19:18), Max: 98.3 (09-19-20 @ 19:18)  HR: 75 (09-19-20 @ 19:18) (71 - 75)  BP: 210/110 (09-19-20 @ 19:18) (210/110 - 251/123)  RR:  (15 - 18)  SpO2:  (98% - 100%)  Wt(kg): --  General: AAO x 3, appropriate mood and affect    Ophthalmology Exam:  Visual acuity (cc): CF 3 feet, CF 2 feet  Pupils: Dilated, minimally reactive OU  Tonometry: 16, 15  Extraocular movements (EOMs): Full OU, no pain, no diplopia.    Pen Light Exam (PLE)  External: Normal OU.  Lids/Lashes/Lacrimal Ducts: Flat OU.  Sclera/Conjunctiva: White and quiet OU.  Cornea: Clear OU.  Anterior Chamber: Deep and formed OU.    Iris: Flat OU.  Lens: 1-2+ NS    Fundus Exam: dilated with 1% tropicamide and 2.5% phenylephrine  Approval obtained from primary team for dilation  Patient aware that pupils can remained dilated for at least 4-6 hours.  Exam performed with 20 D lens    Vitreous: wnl OU  Disc, cup/disc: OD: Pale, 0.9. OS: 0.8  Macula: OD: Macula wnl. OS: Hector, ischemic looking macula. Cherry red spot  Vessels: wnl OU  Periphery: Superficial whitening of posterior pole. DBH noted OU    Labs/Imaging:  ESR 6  CRP < 4.0  CTA Head/Neck: CT HEAD: Mildly motion degraded. No gross acute intracranial hemorrhage or mass effect from vasogenic edema. Mild chronic microvascular changes. CTA NECK: No stenosis of the cervical carotid arteries based on NASCET criteria. Patent cervical vertebral arteries. No evidence of dissection. CTA HEAD: No high-grade stenosis or occlusion of the major proximal arterial branches. No evidence of intracranial dissection or AVM.

## 2020-09-19 NOTE — ED PROVIDER NOTE - NS ED ROS FT
GENERAL: No fever or chills, EYES: vision loss, HEENT: no trouble speaking, CARDIAC: no chest pain, palpitation PULMONARY: no cough or SOB, GI: no abdominal pain, no nausea, no vomiting, no diarrhea or constipation, : No changes in urination, SKIN: no rashes, NEURO: no headache,  MSK: No muscle pain ~James Garcia MD

## 2020-09-19 NOTE — ED ADULT NURSE NOTE - CHIEF COMPLAINT QUOTE
pt sent in by opthomalogist for left eye blindness x2 days. PMH DM, HTN- compliant with medication. Pt hypertensive at 247/117 in triage. Denies CP, SOB, N/V, H/A, fever. Wife can be reached at [phone]

## 2020-09-19 NOTE — ED PROVIDER NOTE - OBJECTIVE STATEMENT
75 yoM  pmh HTN, HLD, DM, chronic right eye vision loss sent by ophthalmologist for painless left eye blindness x 2 days. Pt states that he was told by the ophthalmologist that he had "blocked artery" in eye and to come to the ED. pt states that his vision loss was 75 yoM  pmh HTN, HLD, DM, chronic right eye vision loss sent by ophthalmologist for painless left eye blindness x 2 days. Pt states that he was told by the ophthalmologist that he had "blocked artery" in eye and to come to the ED. pt states that his vision loss was sudden onset. Report only being able to see light and blurred objects. Denies any fever, eye pain, cp, sob, palpitation, weakness or numbness

## 2020-09-20 DIAGNOSIS — Z29.9 ENCOUNTER FOR PROPHYLACTIC MEASURES, UNSPECIFIED: ICD-10-CM

## 2020-09-20 DIAGNOSIS — I10 ESSENTIAL (PRIMARY) HYPERTENSION: ICD-10-CM

## 2020-09-20 DIAGNOSIS — E87.6 HYPOKALEMIA: ICD-10-CM

## 2020-09-20 DIAGNOSIS — H54.62 UNQUALIFIED VISUAL LOSS, LEFT EYE, NORMAL VISION RIGHT EYE: ICD-10-CM

## 2020-09-20 DIAGNOSIS — E11.39 TYPE 2 DIABETES MELLITUS WITH OTHER DIABETIC OPHTHALMIC COMPLICATION: ICD-10-CM

## 2020-09-20 LAB
ANION GAP SERPL CALC-SCNC: 17 MMO/L — HIGH (ref 7–14)
BUN SERPL-MCNC: 18 MG/DL — SIGNIFICANT CHANGE UP (ref 7–23)
CALCIUM SERPL-MCNC: 9 MG/DL — SIGNIFICANT CHANGE UP (ref 8.4–10.5)
CHLORIDE SERPL-SCNC: 102 MMOL/L — SIGNIFICANT CHANGE UP (ref 98–107)
CHOLEST SERPL-MCNC: 147 MG/DL — SIGNIFICANT CHANGE UP (ref 120–199)
CO2 SERPL-SCNC: 20 MMOL/L — LOW (ref 22–31)
CREAT SERPL-MCNC: 1.08 MG/DL — SIGNIFICANT CHANGE UP (ref 0.5–1.3)
GLUCOSE BLDC GLUCOMTR-MCNC: 110 MG/DL — HIGH (ref 70–99)
GLUCOSE BLDC GLUCOMTR-MCNC: 121 MG/DL — HIGH (ref 70–99)
GLUCOSE BLDC GLUCOMTR-MCNC: 141 MG/DL — HIGH (ref 70–99)
GLUCOSE BLDC GLUCOMTR-MCNC: 143 MG/DL — HIGH (ref 70–99)
GLUCOSE SERPL-MCNC: 132 MG/DL — HIGH (ref 70–99)
HBA1C BLD-MCNC: 6.3 % — HIGH (ref 4–5.6)
HCT VFR BLD CALC: 38.6 % — LOW (ref 39–50)
HDLC SERPL-MCNC: 55 MG/DL — SIGNIFICANT CHANGE UP (ref 35–55)
HGB BLD-MCNC: 12.3 G/DL — LOW (ref 13–17)
LIPID PNL WITH DIRECT LDL SERPL: 94 MG/DL — SIGNIFICANT CHANGE UP
MAGNESIUM SERPL-MCNC: 1.9 MG/DL — SIGNIFICANT CHANGE UP (ref 1.6–2.6)
MCHC RBC-ENTMCNC: 27.3 PG — SIGNIFICANT CHANGE UP (ref 27–34)
MCHC RBC-ENTMCNC: 31.9 % — LOW (ref 32–36)
MCV RBC AUTO: 85.6 FL — SIGNIFICANT CHANGE UP (ref 80–100)
NRBC # FLD: 0 K/UL — SIGNIFICANT CHANGE UP (ref 0–0)
PHOSPHATE SERPL-MCNC: 3.5 MG/DL — SIGNIFICANT CHANGE UP (ref 2.5–4.5)
PLATELET # BLD AUTO: 221 K/UL — SIGNIFICANT CHANGE UP (ref 150–400)
PMV BLD: 9.9 FL — SIGNIFICANT CHANGE UP (ref 7–13)
POTASSIUM SERPL-MCNC: 3.4 MMOL/L — LOW (ref 3.5–5.3)
POTASSIUM SERPL-SCNC: 3.4 MMOL/L — LOW (ref 3.5–5.3)
RBC # BLD: 4.51 M/UL — SIGNIFICANT CHANGE UP (ref 4.2–5.8)
RBC # FLD: 13.3 % — SIGNIFICANT CHANGE UP (ref 10.3–14.5)
SARS-COV-2 RNA SPEC QL NAA+PROBE: SIGNIFICANT CHANGE UP
SODIUM SERPL-SCNC: 139 MMOL/L — SIGNIFICANT CHANGE UP (ref 135–145)
TRIGL SERPL-MCNC: 83 MG/DL — SIGNIFICANT CHANGE UP (ref 10–149)
TSH SERPL-MCNC: 4.63 UIU/ML — HIGH (ref 0.27–4.2)
WBC # BLD: 8.64 K/UL — SIGNIFICANT CHANGE UP (ref 3.8–10.5)
WBC # FLD AUTO: 8.64 K/UL — SIGNIFICANT CHANGE UP (ref 3.8–10.5)

## 2020-09-20 PROCEDURE — 93880 EXTRACRANIAL BILAT STUDY: CPT | Mod: 26

## 2020-09-20 PROCEDURE — 12345: CPT | Mod: NC

## 2020-09-20 PROCEDURE — 71045 X-RAY EXAM CHEST 1 VIEW: CPT | Mod: 26

## 2020-09-20 PROCEDURE — 99223 1ST HOSP IP/OBS HIGH 75: CPT

## 2020-09-20 RX ORDER — METOPROLOL TARTRATE 50 MG
100 TABLET ORAL
Refills: 0 | Status: DISCONTINUED | OUTPATIENT
Start: 2020-09-20 | End: 2020-09-23

## 2020-09-20 RX ORDER — LABETALOL HCL 100 MG
5 TABLET ORAL ONCE
Refills: 0 | Status: COMPLETED | OUTPATIENT
Start: 2020-09-20 | End: 2020-09-20

## 2020-09-20 RX ORDER — LISINOPRIL 2.5 MG/1
40 TABLET ORAL DAILY
Refills: 0 | Status: DISCONTINUED | OUTPATIENT
Start: 2020-09-20 | End: 2020-09-23

## 2020-09-20 RX ORDER — HYDRALAZINE HCL 50 MG
5 TABLET ORAL ONCE
Refills: 0 | Status: COMPLETED | OUTPATIENT
Start: 2020-09-20 | End: 2020-09-20

## 2020-09-20 RX ORDER — GLUCAGON INJECTION, SOLUTION 0.5 MG/.1ML
1 INJECTION, SOLUTION SUBCUTANEOUS ONCE
Refills: 0 | Status: DISCONTINUED | OUTPATIENT
Start: 2020-09-20 | End: 2020-09-23

## 2020-09-20 RX ORDER — DEXTROSE 50 % IN WATER 50 %
12.5 SYRINGE (ML) INTRAVENOUS ONCE
Refills: 0 | Status: DISCONTINUED | OUTPATIENT
Start: 2020-09-20 | End: 2020-09-23

## 2020-09-20 RX ORDER — DEXTROSE 50 % IN WATER 50 %
15 SYRINGE (ML) INTRAVENOUS ONCE
Refills: 0 | Status: DISCONTINUED | OUTPATIENT
Start: 2020-09-20 | End: 2020-09-23

## 2020-09-20 RX ORDER — ACETAMINOPHEN 500 MG
650 TABLET ORAL EVERY 6 HOURS
Refills: 0 | Status: DISCONTINUED | OUTPATIENT
Start: 2020-09-20 | End: 2020-09-23

## 2020-09-20 RX ORDER — INFLUENZA VIRUS VACCINE 15; 15; 15; 15 UG/.5ML; UG/.5ML; UG/.5ML; UG/.5ML
0.5 SUSPENSION INTRAMUSCULAR ONCE
Refills: 0 | Status: DISCONTINUED | OUTPATIENT
Start: 2020-09-20 | End: 2020-09-23

## 2020-09-20 RX ORDER — ASPIRIN/CALCIUM CARB/MAGNESIUM 324 MG
81 TABLET ORAL DAILY
Refills: 0 | Status: DISCONTINUED | OUTPATIENT
Start: 2020-09-20 | End: 2020-09-23

## 2020-09-20 RX ORDER — LABETALOL HCL 100 MG
10 TABLET ORAL ONCE
Refills: 0 | Status: COMPLETED | OUTPATIENT
Start: 2020-09-20 | End: 2020-09-20

## 2020-09-20 RX ORDER — POTASSIUM CHLORIDE 20 MEQ
10 PACKET (EA) ORAL ONCE
Refills: 0 | Status: COMPLETED | OUTPATIENT
Start: 2020-09-20 | End: 2020-09-20

## 2020-09-20 RX ORDER — HEPARIN SODIUM 5000 [USP'U]/ML
5000 INJECTION INTRAVENOUS; SUBCUTANEOUS EVERY 12 HOURS
Refills: 0 | Status: DISCONTINUED | OUTPATIENT
Start: 2020-09-20 | End: 2020-09-23

## 2020-09-20 RX ORDER — DEXTROSE 50 % IN WATER 50 %
25 SYRINGE (ML) INTRAVENOUS ONCE
Refills: 0 | Status: DISCONTINUED | OUTPATIENT
Start: 2020-09-20 | End: 2020-09-23

## 2020-09-20 RX ORDER — SODIUM CHLORIDE 9 MG/ML
1000 INJECTION INTRAMUSCULAR; INTRAVENOUS; SUBCUTANEOUS
Refills: 0 | Status: DISCONTINUED | OUTPATIENT
Start: 2020-09-20 | End: 2020-09-20

## 2020-09-20 RX ORDER — INSULIN LISPRO 100/ML
VIAL (ML) SUBCUTANEOUS AT BEDTIME
Refills: 0 | Status: DISCONTINUED | OUTPATIENT
Start: 2020-09-20 | End: 2020-09-23

## 2020-09-20 RX ORDER — SODIUM CHLORIDE 9 MG/ML
1000 INJECTION, SOLUTION INTRAVENOUS
Refills: 0 | Status: DISCONTINUED | OUTPATIENT
Start: 2020-09-20 | End: 2020-09-23

## 2020-09-20 RX ORDER — INSULIN LISPRO 100/ML
VIAL (ML) SUBCUTANEOUS
Refills: 0 | Status: DISCONTINUED | OUTPATIENT
Start: 2020-09-20 | End: 2020-09-23

## 2020-09-20 RX ADMIN — SODIUM CHLORIDE 100 MILLILITER(S): 9 INJECTION INTRAMUSCULAR; INTRAVENOUS; SUBCUTANEOUS at 12:06

## 2020-09-20 RX ADMIN — Medication 5 MILLIGRAM(S): at 12:23

## 2020-09-20 RX ADMIN — HEPARIN SODIUM 5000 UNIT(S): 5000 INJECTION INTRAVENOUS; SUBCUTANEOUS at 18:16

## 2020-09-20 RX ADMIN — Medication 100 MILLIGRAM(S): at 16:35

## 2020-09-20 RX ADMIN — Medication 10 MILLIEQUIVALENT(S): at 16:49

## 2020-09-20 RX ADMIN — HEPARIN SODIUM 5000 UNIT(S): 5000 INJECTION INTRAVENOUS; SUBCUTANEOUS at 06:14

## 2020-09-20 RX ADMIN — Medication 81 MILLIGRAM(S): at 12:06

## 2020-09-20 RX ADMIN — Medication 5 MILLIGRAM(S): at 18:16

## 2020-09-20 RX ADMIN — Medication 5 MILLIGRAM(S): at 20:58

## 2020-09-20 RX ADMIN — Medication 100 MILLIGRAM(S): at 06:14

## 2020-09-20 RX ADMIN — SODIUM CHLORIDE 100 MILLILITER(S): 9 INJECTION INTRAMUSCULAR; INTRAVENOUS; SUBCUTANEOUS at 01:38

## 2020-09-20 RX ADMIN — Medication 81 MILLIGRAM(S): at 00:41

## 2020-09-20 RX ADMIN — Medication 10 MILLIGRAM(S): at 22:14

## 2020-09-20 RX ADMIN — Medication 5 MILLIGRAM(S): at 23:48

## 2020-09-20 RX ADMIN — LISINOPRIL 40 MILLIGRAM(S): 2.5 TABLET ORAL at 06:14

## 2020-09-20 NOTE — H&P ADULT - PROBLEM SELECTOR PLAN 3
monitor bp   patient s/p IV Labetalol  10mg x 1 + Labetalol 100mg x1   in ED 's   Repeat Bp 155/75  gradual normotension - will give IV fluids    patient takes Ramipril 10mg + Metoprolol 100mg BID   will hold for now , can restart as needed Elevated BPs initially, good response to labetalol IV and PO  monitor bp   patient s/p IV Labetalol  10mg x 1 + Labetalol 100mg x1   in ED 's   Repeat Bp 155/75  gradual normotension - will give IV fluids    patient takes Ramipril 10mg + Metoprolol 100mg BID, given symptoms started on Thursday night patient would not be a candidate for permissive HTN at this time, spoke with neuro, OK to start home meds, will therefore continue with his home BP medications with hold parameters

## 2020-09-20 NOTE — PHYSICAL THERAPY INITIAL EVALUATION ADULT - CRITERIA FOR SKILLED THERAPEUTIC INTERVENTIONS
predicted duration of therapy intervention/rehab potential/impairments found/functional limitations in following categories/risk reduction/prevention/therapy frequency/anticipated discharge recommendation

## 2020-09-20 NOTE — PROGRESS NOTE ADULT - PROBLEM SELECTOR PLAN 1
Concern for CRAO   pt sent in from O/P Optho office   + cherry red spot on exam by ophthalmology  Low concern for GCA given normal ESR/CRP and no typical symptoms  will R/O CVA   neuro checks q 4    passed dysphagia screen on dysphagia 1 diet- f/u S+S   MRI brain ordered   US Carotid doppler B/L  ASA 81mg   TTE pending  F/U Neuro + Optho recs 98.4

## 2020-09-20 NOTE — H&P ADULT - ATTENDING COMMENTS
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 Seen by neurology and ophthalmology and CVA work up currently pending. Patient currently reports being able to see shapes and colors but cannot see details. Otherwise denies any other complaints. Of note, had HTN urgency in ED given elevated BPs to 250s/120s (pt does not report missing any home medications) s/p labetalol IV+PO with good response in BPs.  - Will c/w work up as above, f/u further neurology and ophthalmology recommendations.

## 2020-09-20 NOTE — H&P ADULT - PROBLEM SELECTOR PLAN 4
DVT ppx: Heparin SQ DVT ppx: Heparin SQ  Activity: Ambulate with assistance  Diet: Dysphagia 1 for now, advance as tolerated

## 2020-09-20 NOTE — H&P ADULT - NEGATIVE NEUROLOGICAL SYMPTOMS
no generalized seizures/no loss of sensation/no paresthesias/no headache/no weakness/no focal seizures

## 2020-09-20 NOTE — H&P ADULT - HISTORY OF PRESENT ILLNESS
76 yo M PMHx T2DM, HTN, R eye partial vision loss (was told 2/2 DM - 2015) presenting with c/o new onset painless Left eye vision loss x 2 days. As per patient he was watching TV x 2 days ago when he had sudden vision loss in his left eye. Patient  denies any preceding spots/floaters prior and reports he can see shapes. He saw his ophthalmologist who saw findings suspicious for CRAO and recommended him to go to the ED for stroke workup. Denies hx of TIA/CVA, weight loss, fever, chills,  headache, neck/jaw pain, eye pain, chest pain, abdominal pain, N/V, paresthesias, weakness.       In ED vitals: Temp 98.3 HR75 Bp: 210/110 Sp02: 99% on RA. CT angio head/neck neg. Seen by Optho + Neurology. Eye exam +cherry red spot.      76 yo M PMHx T2DM, HTN, R eye partial vision loss (was told 2/2 DM - 2015) presenting with c/o new onset painless Left eye vision loss x 2 days. As per patient he was watching TV x 2 days ago when he had sudden vision loss in his left eye. Patient  denies any preceding spots/floaters prior and reports he can see shapes. Patient reports he has had gradual changes in his Right eye vision for the past 6-8 months. He saw his ophthalmologist who saw findings suspicious for CRAO and recommended him to go to the ED for stroke workup. Denies hx of TIA/CVA, weight loss, fever, chills,  headache, neck/jaw pain, eye pain, chest pain, abdominal pain, N/V, paresthesias, weakness.     In ED vitals: Temp 98.3 HR75 Bp: 210/110 Sp02: 99% on RA. CT angio head/neck neg. Seen by Neurology & Optho eye exam +cherry red spot. 76 yo M PMHx T2DM, HTN, R eye partial vision loss (was told 2/2 DM - 2015) presenting with c/o new onset painless Left eye vision loss x 2 days. As per patient he was watching TV x 2 days ago when he had sudden vision loss in his left eye. Patient  denies any preceding spots/floaters prior and reports he can see shapes. Patient reports he has had gradual changes in his Right eye vision for the past 6-8 months. He saw his ophthalmologist who saw findings suspicious for CRAO and recommended him to go to the ED for stroke workup. Denies hx of TIA/CVA, weight loss, fever, chills,  headache, neck/jaw pain, eye pain, chest pain, abdominal pain, N/V, paresthesias, weakness.     In ED vitals: Temp 98.3 HR75 Bp: 210/110 -> 251/123 -> 155/77 (after labetalol IV and PO) Sp02: 99% on RA. CT angio head/neck neg. Seen by Neurology & Optho eye exam +cherry red spot. Lab work and imaging in ED so far negative for any acute pathologies. He was seen and evaluated by ophthalmology and neurology in the ED.

## 2020-09-20 NOTE — H&P ADULT - NEGATIVE ENMT SYMPTOMS
no tinnitus/no vertigo/no ear pain no ear pain/no tinnitus/no vertigo/no nasal discharge/no sinus symptoms/no nasal congestion

## 2020-09-20 NOTE — H&P ADULT - NSHPPHYSICALEXAM_GEN_ALL_CORE
Vital Signs Last 24 Hrs  T(C): 37.1 (19 Sep 2020 23:38), Max: 37.1 (19 Sep 2020 23:38)  T(F): 98.7 (19 Sep 2020 23:38), Max: 98.7 (19 Sep 2020 23:38)  HR: 63 (19 Sep 2020 23:38) (63 - 78)  BP: 146/86 (19 Sep 2020 23:38) (146/86 - 251/123)  BP(mean): --  RR: 18 (19 Sep 2020 23:38) (15 - 19)  SpO2: 100% (19 Sep 2020 23:38) (98% - 100%)    GENERAL: Resting in no acute distress   HEAD:  Atraumatic, Normocephalic  EYES: Pupils dilated B/L 2/2 eye exam, EOMI, conjunctiva and sclera clear  NECK: Supple, No JVD  CHEST/LUNG: Left side +rhonchi   HEART: Regular rate and rhythm; +S1S2   ABDOMEN: Soft, Nontender, Nondistended; Bowel sounds present  EXTREMITIES:  2+ Peripheral Pulses, No clubbing, cyanosis, or edema  PSYCH: AAOx4  NEUROLOGY: Strength 5/5 B/L, Sensation intact throughout   SKIN: No rashes or lesions Vital Signs Last 24 Hrs  T(C): 37.1 (19 Sep 2020 23:38), Max: 37.1 (19 Sep 2020 23:38)  T(F): 98.7 (19 Sep 2020 23:38), Max: 98.7 (19 Sep 2020 23:38)  HR: 63 (19 Sep 2020 23:38) (63 - 78)  BP: 146/86 (19 Sep 2020 23:38) (146/86 - 251/123)  BP(mean): --  RR: 18 (19 Sep 2020 23:38) (15 - 19)  SpO2: 100% (19 Sep 2020 23:38) (98% - 100%)    GENERAL: Resting in no acute distress   HEAD:  Atraumatic, Normocephalic  EYES: Pupils dilated B/L 2/2 eye exam, EOMI, conjunctiva and sclera clear  NECK: Supple, No JVD  CHEST/LUNG: Left side +rhonchi, otherwise clear to auscultation  HEART: Regular rate and rhythm; +S1S2, no m/r/g  ABDOMEN: Soft, Nontender, Nondistended; Bowel sounds present  EXTREMITIES:  2+ Peripheral Pulses, No clubbing, cyanosis, or edema  PSYCH: AAOx4, nl affect, nl behavior  NEUROLOGY: Strength 5/5 B/L, Sensation intact throughout   SKIN: No rashes or lesions

## 2020-09-20 NOTE — PHYSICAL THERAPY INITIAL EVALUATION ADULT - PERTINENT HX OF CURRENT PROBLEM, REHAB EVAL
Patient is a 75 year old male admitted for left eye vision loss, ED workup negative for stroke. PMH listed below

## 2020-09-20 NOTE — H&P ADULT - NSHPSOCIALHISTORY_GEN_ALL_CORE
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 Denies smoking tobacco or drinking alcohol.

## 2020-09-20 NOTE — PROVIDER CONTACT NOTE (OTHER) - SITUATION
Patient's blood pressure remaining above normal limits after administering labetalol 5 mg, and labetalol 10mg.  Patient's last blood pressure 196/101.

## 2020-09-20 NOTE — H&P ADULT - ASSESSMENT
74 yo M PMHx T2DM, HTN, R eye partial vision loss (was told 2/2 DM - 2015) presenting with c/o new onset painless Left eye vision loss x 2 days

## 2020-09-20 NOTE — H&P ADULT - NSHPLABSRESULTS_GEN_ALL_CORE
14.2   9.47  )-----------( 260      ( 19 Sep 2020 17:10 )             44.2   09-19    140  |  101  |  21  ----------------------------<  172<H>  4.0   |  24  |  1.13    Ca    9.9      19 Sep 2020 17:10    TPro  7.7  /  Alb  4.7  /  TBili  0.6  /  DBili  x   /  AST  27  /  ALT  14  /  AlkPhos  72  09-19      EKG: SR @73 bpm QTc- 456     CT Angio Neck w/ IV Cont (09.19.20 @ 20:11) >    CT BRAIN:  There is motion artifact present which degrades image quality.    There is no gross evidence of acute intracranial hemorrhage, mass effect from vasogenic edema or large vascular territory infarct. Patchy areas of low-attenuation are seen in the bihemispheric white matter, nonspecific, but likely the sequela of mild chronic microvascular changes.    The ventricles, sulci and cisternal spaces are mildly diffusely prominent although in proportion compatible with age-related cerebral volume loss. No midline shift or extra-axial fluid collection.    Retention cystsuggested in the left maxillary sinus. The reminder of visualized paranasal sinuses and mastoid air cells are clear. No displaced calvarial fracture.    CT ANGIOGRAM OF THE NECK:    There is left-sided aortic arch. Right and left vertebral arteries arise directly from the aortic arch and patent from their origins to the skull base. Codominant vertebral arteries.    The common carotid arteries are widely patent from the origins to the bifurcations. Prominent medial looping of the cervical segment of the right internal carotid artery. The cervical internal carotid arteries are patent without significant stenosis based on NASCET criteria.    Visualized lung apices and straight no focal consolidation. Visualized airways are patent.    The regional soft tissues of the neck are  unremarkable.    Straightening of cervical lordosis. Multilevel degenerative changes of the visualized spine mostly prominent at C5-C6 levels with the loss of intervertebral disc space, marginal osteophyte formation, degenerative changes of the facet and uncovertebral joints with the posterior disc osteophyte complexes protruding into the cervical canal.    CT ANGIOGRAM OF THE HEAD:    The skull base and intracranial carotid arteries are patent. The proximal and distal MCAs and ACAs are patent without significant stenosis.    The skull base and intradural vertebral arteries and basilar artery are widely patent. The proximal PCAs are patent without significant stenosis.    There is no evidence of an intracranial arterial aneurysm or arteriovenous malformation within the confines of this examination.    Intracranial superficial and deep venous sinus system are grossly unremarkable.      IMPRESSION:    CT HEAD: Mildly motion degraded. No gross acute intracranial hemorrhage or mass effect from vasogenic edema. Mild chronic microvascular changes.    CTA NECK: No stenosis of the cervical carotid arteries based on NASCET criteria. Patent cervical vertebral arteries. No evidence of dissection.    CTA HEAD: No high-grade stenosis or occlusion of the major proximal arterial branches. No evidence of intracranial dissection or AVM. LABS and ADDITIONAL STUDIES:                14.2   9.47  )-----------( 260      ( 19 Sep 2020 17:10 )              44.2   09-19    140  |  101  |  21  ----------------------------<  172<H>  4.0   |  24  |  1.13    Ca    9.9      19 Sep 2020 17:10    TPro  7.7  /  Alb  4.7  /  TBili  0.6  /  DBili  x   /  AST  27  /  ALT  14  /  AlkPhos  72  09-19      EKG: SR @73 bpm QTc- 456     CT Angio Neck w/ IV Cont (09.19.20 @ 20:11) >    CT BRAIN:  There is motion artifact present which degrades image quality.    There is no gross evidence of acute intracranial hemorrhage, mass effect from vasogenic edema or large vascular territory infarct. Patchy areas of low-attenuation are seen in the bihemispheric white matter, nonspecific, but likely the sequela of mild chronic microvascular changes.    The ventricles, sulci and cisternal spaces are mildly diffusely prominent although in proportion compatible with age-related cerebral volume loss. No midline shift or extra-axial fluid collection.    Retention cystsuggested in the left maxillary sinus. The reminder of visualized paranasal sinuses and mastoid air cells are clear. No displaced calvarial fracture.    CT ANGIOGRAM OF THE NECK:    There is left-sided aortic arch. Right and left vertebral arteries arise directly from the aortic arch and patent from their origins to the skull base. Codominant vertebral arteries.    The common carotid arteries are widely patent from the origins to the bifurcations. Prominent medial looping of the cervical segment of the right internal carotid artery. The cervical internal carotid arteries are patent without significant stenosis based on NASCET criteria.    Visualized lung apices and straight no focal consolidation. Visualized airways are patent.    The regional soft tissues of the neck are  unremarkable.    Straightening of cervical lordosis. Multilevel degenerative changes of the visualized spine mostly prominent at C5-C6 levels with the loss of intervertebral disc space, marginal osteophyte formation, degenerative changes of the facet and uncovertebral joints with the posterior disc osteophyte complexes protruding into the cervical canal.    CT ANGIOGRAM OF THE HEAD:    The skull base and intracranial carotid arteries are patent. The proximal and distal MCAs and ACAs are patent without significant stenosis.    The skull base and intradural vertebral arteries and basilar artery are widely patent. The proximal PCAs are patent without significant stenosis.    There is no evidence of an intracranial arterial aneurysm or arteriovenous malformation within the confines of this examination.    Intracranial superficial and deep venous sinus system are grossly unremarkable.      IMPRESSION:    CT HEAD: Mildly motion degraded. No gross acute intracranial hemorrhage or mass effect from vasogenic edema. Mild chronic microvascular changes.    CTA NECK: No stenosis of the cervical carotid arteries based on NASCET criteria. Patent cervical vertebral arteries. No evidence of dissection.    CTA HEAD: No high-grade stenosis or occlusion of the major proximal arterial branches. No evidence of intracranial dissection or AVM.    EKG - LABS and ADDITIONAL STUDIES:                14.2   9.47  )-----------( 260      ( 19 Sep 2020 17:10 )              44.2   09-19    140  |  101  |  21  ----------------------------<  172<H>  4.0   |  24  |  1.13    Ca    9.9      19 Sep 2020 17:10    TPro  7.7  /  Alb  4.7  /  TBili  0.6  /  DBili  x   /  AST  27  /  ALT  14  /  AlkPhos  72  09-19      EKG: NSR @73 bpm QTc- 456, L axis deviation, read as LAFB but does not have prolonged R wave peak time in lead aVL, Possible LVH, nonspecific ST-T wave changes in aVL and V6 (no prior EKG for comparison)     CT Angio Neck w/ IV Cont (09.19.20 @ 20:11) >    CT BRAIN:  There is motion artifact present which degrades image quality.    There is no gross evidence of acute intracranial hemorrhage, mass effect from vasogenic edema or large vascular territory infarct. Patchy areas of low-attenuation are seen in the bihemispheric white matter, nonspecific, but likely the sequela of mild chronic microvascular changes.    The ventricles, sulci and cisternal spaces are mildly diffusely prominent although in proportion compatible with age-related cerebral volume loss. No midline shift or extra-axial fluid collection.    Retention cyst suggested in the left maxillary sinus. The reminder of visualized paranasal sinuses and mastoid air cells are clear. No displaced calvarial fracture.    CT ANGIOGRAM OF THE NECK:    There is left-sided aortic arch. Right and left vertebral arteries arise directly from the aortic arch and patent from their origins to the skull base. Codominant vertebral arteries.    The common carotid arteries are widely patent from the origins to the bifurcations. Prominent medial looping of the cervical segment of the right internal carotid artery. The cervical internal carotid arteries are patent without significant stenosis based on NASCET criteria.    Visualized lung apices and straight no focal consolidation. Visualized airways are patent.    The regional soft tissues of the neck are  unremarkable.    Straightening of cervical lordosis. Multilevel degenerative changes of the visualized spine mostly prominent at C5-C6 levels with the loss of intervertebral disc space, marginal osteophyte formation, degenerative changes of the facet and uncovertebral joints with the posterior disc osteophyte complexes protruding into the cervical canal.    CT ANGIOGRAM OF THE HEAD:    The skull base and intracranial carotid arteries are patent. The proximal and distal MCAs and ACAs are patent without significant stenosis.    The skull base and intradural vertebral arteries and basilar artery are widely patent. The proximal PCAs are patent without significant stenosis.    There is no evidence of an intracranial arterial aneurysm or arteriovenous malformation within the confines of this examination.    Intracranial superficial and deep venous sinus system are grossly unremarkable.      IMPRESSION:    CT HEAD: Mildly motion degraded. No gross acute intracranial hemorrhage or mass effect from vasogenic edema. Mild chronic microvascular changes.    CTA NECK: No stenosis of the cervical carotid arteries based on NASCET criteria. Patent cervical vertebral arteries. No evidence of dissection.    CTA HEAD: No high-grade stenosis or occlusion of the major proximal arterial branches. No evidence of intracranial dissection or AVM.

## 2020-09-20 NOTE — H&P ADULT - PROBLEM SELECTOR PLAN 1
may be 2/2 CRAO   pt sent in from O/P Optho office   + cherry red spot   GCA r/o - normal ESR/CRP   will R/O CVA   neuro checks q 4    passed dysphagia screen on dysphagia 1 diet- f/u S+S   MRI brain ordered   US Carotid doppler B/L  ASA 81mg   TTE ordered   F/U Neuro + Optho recs Concern for CRAO   pt sent in from O/P Optho office   + cherry red spot on exam by ophthalmology  Low concern for GCA given normal ESR/CRP and no typical symptoms  will R/O CVA   neuro checks q 4    passed dysphagia screen on dysphagia 1 diet- f/u S+S   MRI brain ordered   US Carotid doppler B/L  ASA 81mg   TTE ordered   F/U Neuro + Optho recs

## 2020-09-20 NOTE — H&P ADULT - PROBLEM SELECTOR PLAN 2
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

## 2020-09-21 ENCOUNTER — TRANSCRIPTION ENCOUNTER (OUTPATIENT)
Age: 75
End: 2020-09-21

## 2020-09-21 LAB
ANION GAP SERPL CALC-SCNC: 17 MMO/L — HIGH (ref 7–14)
BUN SERPL-MCNC: 17 MG/DL — SIGNIFICANT CHANGE UP (ref 7–23)
CALCIUM SERPL-MCNC: 9.8 MG/DL — SIGNIFICANT CHANGE UP (ref 8.4–10.5)
CHLORIDE SERPL-SCNC: 102 MMOL/L — SIGNIFICANT CHANGE UP (ref 98–107)
CO2 SERPL-SCNC: 22 MMOL/L — SIGNIFICANT CHANGE UP (ref 22–31)
CREAT SERPL-MCNC: 1.19 MG/DL — SIGNIFICANT CHANGE UP (ref 0.5–1.3)
GLUCOSE BLDC GLUCOMTR-MCNC: 133 MG/DL — HIGH (ref 70–99)
GLUCOSE BLDC GLUCOMTR-MCNC: 135 MG/DL — HIGH (ref 70–99)
GLUCOSE BLDC GLUCOMTR-MCNC: 144 MG/DL — HIGH (ref 70–99)
GLUCOSE BLDC GLUCOMTR-MCNC: 169 MG/DL — HIGH (ref 70–99)
GLUCOSE SERPL-MCNC: 165 MG/DL — HIGH (ref 70–99)
HCT VFR BLD CALC: 42.6 % — SIGNIFICANT CHANGE UP (ref 39–50)
HCV AB S/CO SERPL IA: 0.09 S/CO — SIGNIFICANT CHANGE UP (ref 0–0.99)
HCV AB SERPL-IMP: SIGNIFICANT CHANGE UP
HGB BLD-MCNC: 13.9 G/DL — SIGNIFICANT CHANGE UP (ref 13–17)
MAGNESIUM SERPL-MCNC: 1.9 MG/DL — SIGNIFICANT CHANGE UP (ref 1.6–2.6)
MCHC RBC-ENTMCNC: 27.1 PG — SIGNIFICANT CHANGE UP (ref 27–34)
MCHC RBC-ENTMCNC: 32.6 % — SIGNIFICANT CHANGE UP (ref 32–36)
MCV RBC AUTO: 83 FL — SIGNIFICANT CHANGE UP (ref 80–100)
NRBC # FLD: 0 K/UL — SIGNIFICANT CHANGE UP (ref 0–0)
PLATELET # BLD AUTO: 231 K/UL — SIGNIFICANT CHANGE UP (ref 150–400)
PMV BLD: 9.9 FL — SIGNIFICANT CHANGE UP (ref 7–13)
POTASSIUM SERPL-MCNC: 3.9 MMOL/L — SIGNIFICANT CHANGE UP (ref 3.5–5.3)
POTASSIUM SERPL-SCNC: 3.9 MMOL/L — SIGNIFICANT CHANGE UP (ref 3.5–5.3)
RBC # BLD: 5.13 M/UL — SIGNIFICANT CHANGE UP (ref 4.2–5.8)
RBC # FLD: 13.6 % — SIGNIFICANT CHANGE UP (ref 10.3–14.5)
SARS-COV-2 IGG SERPL QL IA: NEGATIVE — SIGNIFICANT CHANGE UP
SARS-COV-2 IGM SERPL IA-ACNC: <0.1 INDEX — SIGNIFICANT CHANGE UP
SODIUM SERPL-SCNC: 141 MMOL/L — SIGNIFICANT CHANGE UP (ref 135–145)
WBC # BLD: 8.6 K/UL — SIGNIFICANT CHANGE UP (ref 3.8–10.5)
WBC # FLD AUTO: 8.6 K/UL — SIGNIFICANT CHANGE UP (ref 3.8–10.5)

## 2020-09-21 PROCEDURE — 99233 SBSQ HOSP IP/OBS HIGH 50: CPT

## 2020-09-21 PROCEDURE — 70551 MRI BRAIN STEM W/O DYE: CPT | Mod: 26

## 2020-09-21 PROCEDURE — 93306 TTE W/DOPPLER COMPLETE: CPT | Mod: 26

## 2020-09-21 PROCEDURE — 99231 SBSQ HOSP IP/OBS SF/LOW 25: CPT

## 2020-09-21 RX ORDER — NIFEDIPINE 30 MG
30 TABLET, EXTENDED RELEASE 24 HR ORAL DAILY
Refills: 0 | Status: DISCONTINUED | OUTPATIENT
Start: 2020-09-21 | End: 2020-09-22

## 2020-09-21 RX ORDER — NIFEDIPINE 30 MG
30 TABLET, EXTENDED RELEASE 24 HR ORAL DAILY
Refills: 0 | Status: DISCONTINUED | OUTPATIENT
Start: 2020-09-21 | End: 2020-09-21

## 2020-09-21 RX ORDER — LABETALOL HCL 100 MG
10 TABLET ORAL ONCE
Refills: 0 | Status: COMPLETED | OUTPATIENT
Start: 2020-09-21 | End: 2020-09-21

## 2020-09-21 RX ORDER — LABETALOL HCL 100 MG
100 TABLET ORAL ONCE
Refills: 0 | Status: COMPLETED | OUTPATIENT
Start: 2020-09-21 | End: 2020-09-21

## 2020-09-21 RX ADMIN — Medication 650 MILLIGRAM(S): at 06:47

## 2020-09-21 RX ADMIN — HEPARIN SODIUM 5000 UNIT(S): 5000 INJECTION INTRAVENOUS; SUBCUTANEOUS at 17:53

## 2020-09-21 RX ADMIN — LISINOPRIL 40 MILLIGRAM(S): 2.5 TABLET ORAL at 06:22

## 2020-09-21 RX ADMIN — Medication 100 MILLIGRAM(S): at 06:22

## 2020-09-21 RX ADMIN — Medication 10 MILLIGRAM(S): at 16:27

## 2020-09-21 RX ADMIN — Medication 100 MILLIGRAM(S): at 01:02

## 2020-09-21 RX ADMIN — Medication 650 MILLIGRAM(S): at 05:58

## 2020-09-21 RX ADMIN — Medication 1: at 11:50

## 2020-09-21 RX ADMIN — HEPARIN SODIUM 5000 UNIT(S): 5000 INJECTION INTRAVENOUS; SUBCUTANEOUS at 05:57

## 2020-09-21 RX ADMIN — Medication 81 MILLIGRAM(S): at 11:15

## 2020-09-21 RX ADMIN — Medication 30 MILLIGRAM(S): at 11:16

## 2020-09-21 RX ADMIN — Medication 100 MILLIGRAM(S): at 17:53

## 2020-09-21 NOTE — PROVIDER CONTACT NOTE (OTHER) - ACTION/TREATMENT ORDERED:
Per provider Per provider, administer nifedipine, wait to administer any further blood pressure medications. Recheck vitals as per orders.

## 2020-09-21 NOTE — CHART NOTE - NSCHARTNOTEFT_GEN_A_CORE
Overnight pt was Hypertensive as high as 228/101.  Pt denied c/o CP, SOB, HA or dizziness.  He was given IV Labetalol IV 5mg, 10mg and another 5mg with an appropriate drop in his BP and then given Labetalol 100mg PO  Currently:  ICU Vital Signs Last 24 Hrs  T(C): 36.9 (21 Sep 2020 05:47), Max: 37.2 (20 Sep 2020 16:04)  T(F): 98.4 (21 Sep 2020 05:47), Max: 99 (20 Sep 2020 16:04)  HR: 70 (21 Sep 2020 05:47) (58 - 78)  BP: 163/86 (21 Sep 2020 05:47) (158/92 - 237/109)  BP(mean): --  ABP: --  ABP(mean): --  RR: 18 (21 Sep 2020 05:47) (16 - 18)  SpO2: 98% (21 Sep 2020 05:47) (94% - 99%)      A/P:  BP better controlled now  HR appropriate  Pt is due for Metoprolol this morning which can be given  Continue Lisinopril 40mg  Additional dosing of Labetalol as per Day attending  Continue to monitor

## 2020-09-21 NOTE — CHART NOTE - NSCHARTNOTEFT_GEN_A_CORE
Briefly, 75 year old male, PMH DM, HTN, 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 According to the patient, he was watching TV 2 days ago Thursday 9/17 at approximately 8pm. During this time, he suddenly lost sight in his L eye. He states he is able to make out subtle shapes while denying complete vision loss. The L eye vision loss is described painless. Patient did not go to his regular ophthalmologist until today because he was not able to get an appointment sooner. The ophthalmologist saw findings suspicious for a CRAO and advised the patient to go to the ED for a full stroke workup.     MRI brain reviewed and negative.   CTA H/N unremarkable for stenotic disease  Carotid duplex with 1-15% bilateral ICA stenosis    Impression: Sudden L eye vision loss likely 2/2 CRAO; no MRI evidence of infarction however, would consider CRAO to be embolic equivalent.    Recommendations:   [x] Continue Aspirin 81mg PO QD  [] TTE  [] Patient should ILR as outpatient  [] On discharge, patient should follow-up with outpatient Neurology  Provider: Dr. Higinio Arana  Phone: [phone]    Plan discussed with Stroke Attending.

## 2020-09-21 NOTE — DISCHARGE NOTE PROVIDER - NSDCFUSCHEDAPPT_GEN_ALL_CORE_FT
SANGEETA BRAMBILA ; 10/01/2020 ; [facility] [address] SANGEETA BRAMBILA ; 11/06/2020 ; [facility] [address]

## 2020-09-21 NOTE — DISCHARGE NOTE PROVIDER - HOSPITAL COURSE
74 yo M PMHx T2DM, HTN, R eye partial vision loss presenting with c/o painless Left eye vision loss x 2 days admitted for R/o CVA and possible CRAO.    Hospital Course:     Vision loss of left eye.    -Concern for CRAO. Seen by ophtho: + cherry red spot on exam by ophthalmology --> Outpatient Follow-up: Patient should follow-up with his/her ophthalmologist or with [facility] Department of Ophthalmology within 1 week of after discharge  -Low concern for GCA given normal ESR/CRP and no typical symptoms  -MRI brain: no CVA, no acute hemorrhage or mass effect neuro checks q 4    -S&S: regular with thin    -US Carotid doppler B/L: minimal plaque   -ASA 81mg   -TTE pending ----    Type 2 diabetes mellitus with other ophthalmic complication, without long-term current use of insulin.    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    -Elevated BPs initially with goal for gradual normotension  -Patient takes Ramipril 10mg + Metoprolol 100mg BID, given symptoms started on Thursday night patient would not be a candidate for permissive HTN at this time, spoke with neuro, OK to start home meds, will therefore continued with his home BP medications with hold parameters  -Nifedipine added on 9/21  -IV labetalol PRN       Need for prophylactic measure.    -DVT ppx: Heparin SQ  -Activity: Ambulate with assistance  -PT eval: home w/ out pt services   -OT eval: ---   -Diet: regular     Pt medically stable for discharge on _____ as per discussion with ____.   76 yo M PMHx T2DM, HTN, R eye partial vision loss presenting with c/o painless Left eye vision loss x 2 days admitted for R/o CVA and possible CRAO.    Hospital Course:       Vision loss of left eye  -Likely 2/2 CRAO, with cherry red spot seen by Opthalmology, normal ESR/CRP low c/f GCA  - MRI does not demonstrate any infarct, clinically insignificant atherosclerosis on US carotid Doppler   - Regular diet per S/S   - ASA 81mg   - TTE: No intra-cardiac thrombus seen, inconclusive bubble study  - Neuro and Optho following  -ESR, CRP, plt ruled out GCA. Pt has no GCA Sx (no fever, joint pain, temporal scalp sensitivity, jaw claudication, new weight loss, joint pain).  - No OT/PT needs.   -Patient should ILR as outpatient  -On discharge, patient should follow-up with outpatient Neurology  Provider: Dr. Higinio Arana  Phone: [phone]    Glaucoma Both Eyes  -Patient noted with large cup to disc ratios in both optic nerves (0.9 right, 0.8 left) likely indicating severe glaucoma in both eyes, although intraocular pressures were normal.  -Patient told to follow up with ophthalmologist after discharge from hospital for further work-up.        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     HTN (hypertension)  -HTN urgency here, on Metop and Ramipril at home. Out of window for permissive HTN here.   - Cont. Metoprolol, Lisinopril, up-titrate Nifedipine. Will switch Metop to Coreg tomorrow if still uncontrolled.     Hypokalemia  -Resolved.     DVT ppx  -Heparin SQ    PT home outpt PT: Prescription given       On 9/23/2020 discussed with   patient is medically cleared and optimized for discharge today. All medications were reviewed with attending, and sent to mutually agreed upon pharmacy. 76 yo M PMHx T2DM, HTN, R eye partial vision loss presenting with c/o painless Left eye vision loss x 2 days likely 2/2 central retinal artery occlusion (CRAO).     Hospital Course:  Patient was camron by Opthalmology who noted a cherry red spot c/w CRAO. MRI brain, TTE were unremarkable. Hospital course was c/b HTN urgency requiring intermittent IV ant-HTN. His BP regimen was up-titrated and he was discharged home.     # Vision loss of left eye  -Likely 2/2 CRAO, with cherry red spot seen by Opthalmology, normal ESR/CRP low c/f GCA  - MRI does not demonstrate any infarct, clinically insignificant atherosclerosis on US carotid Doppler   - TTE: No intra-cardiac thrombus seen, inconclusive bubble study  - ASA 81mg   - Evaluted by Neuro and Optho  - No OT/PT needs.   -Patient should ILR as outpatient  -On discharge, patient should follow-up with outpatient Neurology  Provider: Dr. Higinio Arana  Phone: [phone]    Glaucoma Both Eyes  -Patient noted with large cup to disc ratios in both optic nerves (0.9 right, 0.8 left) likely indicating severe glaucoma in both eyes, although intraocular pressures were normal.  -Patient told to follow up with ophthalmologist after discharge from hospital for further work-up.    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     HTN (hypertension)  -HTN urgency here, on Metop and Ramipril at home. Out of window for permissive HTN here.   - Cont. Metoprolol, Ramipril, Nifedipine  (new)  - Follow up with OP PCP for BP titration     Hypokalemia  -Resolved.     DVT ppx  -Heparin SQ    PT home outpt PT: Prescription given       On 9/23/2020 discussed with   patient is medically cleared and optimized for discharge today. All medications were reviewed with attending, and sent to mutually agreed upon pharmacy. 74 yo M PMHx T2DM, HTN, R eye partial vision loss presenting with c/o painless Left eye vision loss x 2 days likely 2/2 central retinal artery occlusion (CRAO).     Hospital Course:  Patient was camron by Opthalmology who noted a cherry red spot c/w CRAO likely 2/2 stroke. MRI brain, TTE were unremarkable. Hospital course was c/b HTN urgency requiring intermittent IV ant-HTN. His BP regimen was up-titrated and he was discharged home.     # Vision loss of left eye  -Likely 2/2 CRAO likely 2/2 stroke, with cherry red spot seen by Opthalmology, normal ESR/CRP low c/f GCA  - MRI does not demonstrate any infarct, clinically insignificant atherosclerosis on US carotid Doppler   - TTE: No intra-cardiac thrombus seen, inconclusive bubble study  - ASA 81mg   - Evaluted by Neuro and Optho  - No OT/PT needs.   -Patient should ILR as outpatient  -On discharge, patient should follow-up with outpatient Neurology  Provider: Dr. Higinio Arana  Phone: [phone]    Glaucoma Both Eyes  -Patient noted with large cup to disc ratios in both optic nerves (0.9 right, 0.8 left) likely indicating severe glaucoma in both eyes, although intraocular pressures were normal.  -Patient told to follow up with ophthalmologist after discharge from hospital for further work-up.    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     HTN (hypertension)  -HTN urgency here, on Metop and Ramipril at home. Out of window for permissive HTN here.   - Cont. Metoprolol, Ramipril, Nifedipine  (new)  - Follow up with OP PCP for BP titration     Hypokalemia  -Resolved.     DVT ppx  -Heparin SQ    PT home outpt PT: Prescription given       On 9/23/2020 discussed with   patient is medically cleared and optimized for discharge today. All medications were reviewed with attending, and sent to mutually agreed upon pharmacy.

## 2020-09-21 NOTE — SWALLOW BEDSIDE ASSESSMENT ADULT - COMMENTS
Progress Note 9/20/20: 74 yo M PMHx T2DM, HTN, R eye partial vision loss (was told 2/2 DM - 2015) presenting with c/o new onset painless Left eye vision loss x 2 days     MRI 9/21/20: No evidence of acute hemorrhage, mass or mass effect  CXR 9/20/20: no acute pulmonary disease    Patient was seen seated upright in chair. Patient was alert/awake and verbally responsive to simple questions. Patient able to follow simple directions. Patient denies dysphagia symptoms upon questioning.

## 2020-09-21 NOTE — PROGRESS NOTE ADULT - PROBLEM SELECTOR PLAN 1
Likely 2/2 CRAO, with cherry red spot seen by Opthalmology, normal ESR/CRP low c/f GCA  - MRI does not demonstrate any infarct, clinically insignificant atherosclerosis on US carotid Doppler   - Regular diet per S/S   - ASA 81mg   [ ] TTE pending  - Neuro and Optho following Likely 2/2 CRAO, with cherry red spot seen by Opthalmology, normal ESR/CRP low c/f GCA  - MRI does not demonstrate any infarct, clinically insignificant atherosclerosis on US carotid Doppler   - Regular diet per S/S   - ASA 81mg   [ ] TTE pending  - Neuro and Optho following  [ ] OT evaluation

## 2020-09-21 NOTE — DISCHARGE NOTE PROVIDER - NSDCCPCAREPLAN_GEN_ALL_CORE_FT
PRINCIPAL DISCHARGE DIAGNOSIS  Diagnosis: Vision loss of left eye  Assessment and Plan of Treatment: Secondary to CRAO (central retinal artery occlusion).   Outpatient Follow-up: Patient should follow-up with his/her ophthalmologist or with [facility] Department of Ophthalmology within 1 week of after discharge at:  [address]  [place] [address]  [phone]      SECONDARY DISCHARGE DIAGNOSES  Diagnosis: HTN (hypertension)  Assessment and Plan of Treatment: Continue current blood pressure medication regimen as directed. Monitor for any visual changes, headaches or dizziness.  Monitor blood pressure regularly.  Follow up with your PCP for further management for high blood pressure, please call to make appointment within 1 week of discharge    Diagnosis: Type 2 diabetes mellitus with other ophthalmic complication, without long-term current use of insulin  Assessment and Plan of Treatment: You have Pre-Diabetes. Your A1c is 6.3. Continue consistent carbohydrate diet.  Continue with exercise as tolerated. Follow up with your primary care provider for routine monitoring of blood sugar.     PRINCIPAL DISCHARGE DIAGNOSIS  Diagnosis: Vision loss of left eye  Assessment and Plan of Treatment: Secondary to CRAO (central retinal artery occlusion).   Outpatient Follow-up: Patient should follow-up with his/her ophthalmologist or with [facility] Department of Ophthalmology within 1 week of after discharge at:  [address]  [place] [address]  [phone]      SECONDARY DISCHARGE DIAGNOSES  Diagnosis: Type 2 diabetes mellitus with other ophthalmic complication, without long-term current use of insulin  Assessment and Plan of Treatment: You have Pre-Diabetes. Your A1c is 6.3. Continue consistent carbohydrate diet.  Continue with exercise as tolerated. Follow up with your primary care provider for routine monitoring of blood sugar.    Diagnosis: HTN (hypertension)  Assessment and Plan of Treatment: Continue current blood pressure medication regimen as directed. Monitor for any visual changes, headaches or dizziness.  Monitor blood pressure regularly.  Follow up with your PCP for further management for high blood pressure, please call to make appointment within 1 week of discharge.  Continue metoprolol, lisinopril, and nifedipine as prescribed.     PRINCIPAL DISCHARGE DIAGNOSIS  Diagnosis: Vision loss of left eye  Assessment and Plan of Treatment: Secondary to CRAO (central retinal artery occlusion).   Outpatient Follow-up: Patient should follow-up with his/her ophthalmologist or with [facility] Department of Ophthalmology within 1 week of after discharge at:  [address]  [place] [address]  [phone]  The [facility] medical clinic was emailed and will call you with an appointment. If you do not receive a phone call from the medical clinic within 1 week of discharge.  please call [phone].      SECONDARY DISCHARGE DIAGNOSES  Diagnosis: Type 2 diabetes mellitus with other ophthalmic complication, without long-term current use of insulin  Assessment and Plan of Treatment: The [facility] medical clinic was emailed and will call you with an appointment. If you do not receive a phone call from the medical clinic within 1 week of discharge.  please call [phone].    You have Pre-Diabetes. Your A1c is 6.3. Continue consistent carbohydrate diet.  Continue with exercise as tolerated. Follow up with your primary care provider for routine monitoring of blood sugar.    Diagnosis: HTN (hypertension)  Assessment and Plan of Treatment: Continue current blood pressure medication regimen as directed. Monitor for any visual changes, headaches or dizziness.  Monitor blood pressure regularly.  Follow up with your PCP for further management for high blood pressure, please call to make appointment within 1 week of discharge.  Continue metoprolol, lisinopril, and nifedipine as prescribed.  The [facility] medical clinic was emailed and will call you with an appointment. If you do not receive a phone call from the medical clinic within 1 week of discharge.  please call [phone].

## 2020-09-21 NOTE — DISCHARGE NOTE PROVIDER - CARE PROVIDER_API CALL
[facility]icine Clinic,   Phone: [phone]  Fax: (   )    -  Follow Up Time:    [facility] Clinic,   Phone: [phone]  Fax: (   )    -  Follow Up Time:     Higinio Arana  NEUROLOGY  [address], [address]  [place] [address]  Phone: [phone]  Fax: [phone]  Follow Up Time:

## 2020-09-21 NOTE — DISCHARGE NOTE PROVIDER - NSDCMRMEDTOKEN_GEN_ALL_CORE_FT
metFORMIN: 1500 milligram(s) orally once a day (at bedtime)  Metoprolol Tartrate 100 mg oral tablet: 1 tab(s) orally 2 times a day  ramipril 10 mg oral capsule: 1 cap(s) orally once a day   acetaminophen 325 mg oral tablet: 2 tab(s) orally every 6 hours, As needed, Mild Pain (1 - 3), Moderate Pain (4 - 6)  aspirin 81 mg oral delayed release tablet: 1 tab(s) orally once a day  metFORMIN: 1500 milligram(s) orally once a day (at bedtime)  Metoprolol Tartrate 100 mg oral tablet: 1 tab(s) orally 2 times a day  NIFEdipine 90 mg oral tablet, extended release: 1 tab(s) orally once a day  ramipril 10 mg oral capsule: 1 cap(s) orally once a day

## 2020-09-21 NOTE — SWALLOW BEDSIDE ASSESSMENT ADULT - SWALLOW EVAL: RECOMMENDED FEEDING/EATING TECHNIQUES
maintain upright posture during/after eating for 30 mins/position upright (90 degrees)/oral hygiene/small sips/bites

## 2020-09-21 NOTE — PROVIDER CONTACT NOTE (OTHER) - SITUATION
Patient's blood pressure Patient's blood pressure 192/93, rechecked via manual /90. Patient given nifedipine PO as ordered.

## 2020-09-21 NOTE — SWALLOW BEDSIDE ASSESSMENT ADULT - SWALLOW EVAL: DIAGNOSIS
Patient presents with Functional Oropharyngeal Swallow. The Oral Phase was marked by adequate stripping of bolus from utensil, adequate oral containment, adequate mastication for regular solids, adequate bolus manipulation and functional oral transit time. The Pharyngeal Phase was marked by laryngeal elevation upon digital palpation with NO overt s/s of laryngeal penetration/aspiration for puree consistency, regular solids and thin liquids.

## 2020-09-21 NOTE — PROVIDER CONTACT NOTE (OTHER) - ACTION/TREATMENT ORDERED:
Per provider re-check BP in one hour. Text page with number. IVP 10 mg labetalol administered. Per provider re-check BP in one hour. Text page with number. Patient's BP was rechecked 1 hour after IVP labetalol administered 149/85. Per PA Q4 BP checks.

## 2020-09-21 NOTE — DISCHARGE NOTE PROVIDER - PROVIDER TOKENS
FREE:[LAST:[[facility]icine Clinic],PHONE:[phone]],FAX:[(   )    -]] FREE:[LAST:[Medicine Clinic],PHONE:[phone]],FAX:[(   )    -]],PROVIDER:[TOKEN:[[id number]]]

## 2020-09-21 NOTE — DISCHARGE NOTE PROVIDER - NSDCFUADDAPPT_GEN_ALL_CORE_FT
The [facility] medical clinic was emailed and will call you with an appointment. If you do not receive a phone call from the medical clinic within 1 week of discharge.  please call [phone].

## 2020-09-21 NOTE — DISCHARGE NOTE PROVIDER - NSFOLLOWUPCLINICS_GEN_ALL_ED_FT
[facility]  Ophthalmology  [address] [place] [address] [address]  [place] [address]  Phone: [phone]  Fax:   Follow Up Time:

## 2020-09-22 LAB
ANION GAP SERPL CALC-SCNC: 16 MMO/L — HIGH (ref 7–14)
BUN SERPL-MCNC: 19 MG/DL — SIGNIFICANT CHANGE UP (ref 7–23)
CALCIUM SERPL-MCNC: 9.4 MG/DL — SIGNIFICANT CHANGE UP (ref 8.4–10.5)
CHLORIDE SERPL-SCNC: 104 MMOL/L — SIGNIFICANT CHANGE UP (ref 98–107)
CO2 SERPL-SCNC: 21 MMOL/L — LOW (ref 22–31)
CREAT SERPL-MCNC: 1.11 MG/DL — SIGNIFICANT CHANGE UP (ref 0.5–1.3)
GLUCOSE BLDC GLUCOMTR-MCNC: 123 MG/DL — HIGH (ref 70–99)
GLUCOSE BLDC GLUCOMTR-MCNC: 129 MG/DL — HIGH (ref 70–99)
GLUCOSE BLDC GLUCOMTR-MCNC: 146 MG/DL — HIGH (ref 70–99)
GLUCOSE BLDC GLUCOMTR-MCNC: 168 MG/DL — HIGH (ref 70–99)
GLUCOSE BLDC GLUCOMTR-MCNC: 177 MG/DL — HIGH (ref 70–99)
GLUCOSE SERPL-MCNC: 148 MG/DL — HIGH (ref 70–99)
HCT VFR BLD CALC: 43.7 % — SIGNIFICANT CHANGE UP (ref 39–50)
HGB BLD-MCNC: 13.8 G/DL — SIGNIFICANT CHANGE UP (ref 13–17)
MCHC RBC-ENTMCNC: 26.7 PG — LOW (ref 27–34)
MCHC RBC-ENTMCNC: 31.6 % — LOW (ref 32–36)
MCV RBC AUTO: 84.7 FL — SIGNIFICANT CHANGE UP (ref 80–100)
NRBC # FLD: 0 K/UL — SIGNIFICANT CHANGE UP (ref 0–0)
PLATELET # BLD AUTO: 253 K/UL — SIGNIFICANT CHANGE UP (ref 150–400)
PMV BLD: 10 FL — SIGNIFICANT CHANGE UP (ref 7–13)
POTASSIUM SERPL-MCNC: 3.5 MMOL/L — SIGNIFICANT CHANGE UP (ref 3.5–5.3)
POTASSIUM SERPL-SCNC: 3.5 MMOL/L — SIGNIFICANT CHANGE UP (ref 3.5–5.3)
RBC # BLD: 5.16 M/UL — SIGNIFICANT CHANGE UP (ref 4.2–5.8)
RBC # FLD: 13.6 % — SIGNIFICANT CHANGE UP (ref 10.3–14.5)
SODIUM SERPL-SCNC: 141 MMOL/L — SIGNIFICANT CHANGE UP (ref 135–145)
WBC # BLD: 9.23 K/UL — SIGNIFICANT CHANGE UP (ref 3.8–10.5)
WBC # FLD AUTO: 9.23 K/UL — SIGNIFICANT CHANGE UP (ref 3.8–10.5)

## 2020-09-22 PROCEDURE — 99232 SBSQ HOSP IP/OBS MODERATE 35: CPT

## 2020-09-22 RX ORDER — NIFEDIPINE 30 MG
90 TABLET, EXTENDED RELEASE 24 HR ORAL DAILY
Refills: 0 | Status: DISCONTINUED | OUTPATIENT
Start: 2020-09-23 | End: 2020-09-23

## 2020-09-22 RX ORDER — NIFEDIPINE 30 MG
30 TABLET, EXTENDED RELEASE 24 HR ORAL ONCE
Refills: 0 | Status: COMPLETED | OUTPATIENT
Start: 2020-09-22 | End: 2020-09-22

## 2020-09-22 RX ORDER — NIFEDIPINE 30 MG
60 TABLET, EXTENDED RELEASE 24 HR ORAL DAILY
Refills: 0 | Status: DISCONTINUED | OUTPATIENT
Start: 2020-09-22 | End: 2020-09-22

## 2020-09-22 RX ORDER — HYDRALAZINE HCL 50 MG
5 TABLET ORAL ONCE
Refills: 0 | Status: COMPLETED | OUTPATIENT
Start: 2020-09-22 | End: 2020-09-22

## 2020-09-22 RX ADMIN — HEPARIN SODIUM 5000 UNIT(S): 5000 INJECTION INTRAVENOUS; SUBCUTANEOUS at 17:26

## 2020-09-22 RX ADMIN — Medication 5 MILLIGRAM(S): at 02:03

## 2020-09-22 RX ADMIN — Medication 100 MILLIGRAM(S): at 05:24

## 2020-09-22 RX ADMIN — Medication 100 MILLIGRAM(S): at 17:26

## 2020-09-22 RX ADMIN — Medication 81 MILLIGRAM(S): at 12:37

## 2020-09-22 RX ADMIN — Medication 30 MILLIGRAM(S): at 18:48

## 2020-09-22 RX ADMIN — Medication 1: at 12:31

## 2020-09-22 RX ADMIN — Medication 30 MILLIGRAM(S): at 08:52

## 2020-09-22 RX ADMIN — LISINOPRIL 40 MILLIGRAM(S): 2.5 TABLET ORAL at 05:24

## 2020-09-22 RX ADMIN — Medication 30 MILLIGRAM(S): at 05:24

## 2020-09-22 RX ADMIN — HEPARIN SODIUM 5000 UNIT(S): 5000 INJECTION INTRAVENOUS; SUBCUTANEOUS at 05:24

## 2020-09-22 NOTE — PROVIDER CONTACT NOTE (OTHER) - ACTION/TREATMENT ORDERED:
Per MD Mathew Merlin who assessed patient at bedside. Will order one time dose of Nifedipine. Will increase Nifedipine dose to 90 mg for the morning scheduled dose.

## 2020-09-22 NOTE — PROVIDER CONTACT NOTE (OTHER) - RECOMMENDATIONS
Administer Hydralazine 5mg IV Push
Continue to monitor blood pressure Q4, report any further increases in blood pressure.
Okay to give 6pm Metoprolol 100 mg now
Provider made aware. Will continue to monitor.
Reassess blood pressure
anti-hypertensive medication
CLAUDIA Vasquez notified of electronic and manual blood pressures. Administered PO nifedipine, will recheck blood pressure.
Administer IVP labetalol as ordered and re-check patient's blood pressure. Continue to monitor for signs and symptoms of hypertension.

## 2020-09-22 NOTE — PROVIDER CONTACT NOTE (OTHER) - ASSESSMENT
Patient A&Ox4. Patient denies headache, denies chest pain, denies dizziness, and shortness of breath. Patient's /95.
Patient BP elevated 203/89mmHg, pt asymptomatic at this time
Patient alert and oriented. Resting in bed comfortably. No s/s of any distress. No complaints of any pain or discomfort.
Patient hypertensive
Patient's blood pressure eleveated 202/98 mmHg. Patient asymptomatic at this time.
Patient's blood pressure remaining above normal limits after administering labetalol 5 mg, and labetalol 10mg.  Patient's last blood pressure 196/101. Patient denies headache or any pain.
a/ox4, vision loss of left eye, pt bp 187/100
Patient A&Ox4. Patient denies dizziness, denies chest pain, denies shortness of breath. Experiencing R eye burliness and loss of vision in L eye.
Patient A&Ox4. Patient denies headache. Denies dizziness, denies shortness of breath. Patient administered medications as ordered.
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
## 2020-09-22 NOTE — PROGRESS NOTE ADULT - PROBLEM SELECTOR PLAN 1
Likely 2/2 CRAO, with cherry red spot seen by Opthalmology, normal ESR/CRP low c/f GCA  - MRI does not demonstrate any infarct, clinically insignificant atherosclerosis on US carotid Doppler   - Regular diet per S/S   - ASA 81mg   - TTE: No intra-cardiac thrombus seen, inconclusive bubble study  - Neuro and Optho following  - No OT/PT needs

## 2020-09-22 NOTE — OCCUPATIONAL THERAPY INITIAL EVALUATION ADULT - ADDITIONAL COMMENTS
Patient reports that he has both a tub shower and a walk in shower in his home without grab bars or a shower chair.

## 2020-09-23 ENCOUNTER — TRANSCRIPTION ENCOUNTER (OUTPATIENT)
Age: 75
End: 2020-09-23

## 2020-09-23 VITALS
DIASTOLIC BLOOD PRESSURE: 99 MMHG | TEMPERATURE: 98 F | OXYGEN SATURATION: 97 % | SYSTOLIC BLOOD PRESSURE: 169 MMHG | HEART RATE: 94 BPM | RESPIRATION RATE: 18 BRPM

## 2020-09-23 LAB
ANION GAP SERPL CALC-SCNC: 18 MMO/L — HIGH (ref 7–14)
BUN SERPL-MCNC: 21 MG/DL — SIGNIFICANT CHANGE UP (ref 7–23)
CALCIUM SERPL-MCNC: 9.6 MG/DL — SIGNIFICANT CHANGE UP (ref 8.4–10.5)
CHLORIDE SERPL-SCNC: 102 MMOL/L — SIGNIFICANT CHANGE UP (ref 98–107)
CO2 SERPL-SCNC: 20 MMOL/L — LOW (ref 22–31)
CREAT SERPL-MCNC: 1.19 MG/DL — SIGNIFICANT CHANGE UP (ref 0.5–1.3)
GLUCOSE BLDC GLUCOMTR-MCNC: 115 MG/DL — HIGH (ref 70–99)
GLUCOSE BLDC GLUCOMTR-MCNC: 144 MG/DL — HIGH (ref 70–99)
GLUCOSE BLDC GLUCOMTR-MCNC: 164 MG/DL — HIGH (ref 70–99)
GLUCOSE SERPL-MCNC: 163 MG/DL — HIGH (ref 70–99)
HCT VFR BLD CALC: 45.1 % — SIGNIFICANT CHANGE UP (ref 39–50)
HGB BLD-MCNC: 14.7 G/DL — SIGNIFICANT CHANGE UP (ref 13–17)
MAGNESIUM SERPL-MCNC: 2.1 MG/DL — SIGNIFICANT CHANGE UP (ref 1.6–2.6)
MCHC RBC-ENTMCNC: 27.2 PG — SIGNIFICANT CHANGE UP (ref 27–34)
MCHC RBC-ENTMCNC: 32.6 % — SIGNIFICANT CHANGE UP (ref 32–36)
MCV RBC AUTO: 83.4 FL — SIGNIFICANT CHANGE UP (ref 80–100)
NRBC # FLD: 0 K/UL — SIGNIFICANT CHANGE UP (ref 0–0)
PHOSPHATE SERPL-MCNC: 2.7 MG/DL — SIGNIFICANT CHANGE UP (ref 2.5–4.5)
PLATELET # BLD AUTO: 272 K/UL — SIGNIFICANT CHANGE UP (ref 150–400)
PMV BLD: 9.6 FL — SIGNIFICANT CHANGE UP (ref 7–13)
POTASSIUM SERPL-MCNC: 3.9 MMOL/L — SIGNIFICANT CHANGE UP (ref 3.5–5.3)
POTASSIUM SERPL-SCNC: 3.9 MMOL/L — SIGNIFICANT CHANGE UP (ref 3.5–5.3)
RBC # BLD: 5.41 M/UL — SIGNIFICANT CHANGE UP (ref 4.2–5.8)
RBC # FLD: 13.6 % — SIGNIFICANT CHANGE UP (ref 10.3–14.5)
SODIUM SERPL-SCNC: 140 MMOL/L — SIGNIFICANT CHANGE UP (ref 135–145)
WBC # BLD: 10.51 K/UL — HIGH (ref 3.8–10.5)
WBC # FLD AUTO: 10.51 K/UL — HIGH (ref 3.8–10.5)

## 2020-09-23 PROCEDURE — 99239 HOSP IP/OBS DSCHRG MGMT >30: CPT

## 2020-09-23 RX ORDER — NIFEDIPINE 30 MG
1 TABLET, EXTENDED RELEASE 24 HR ORAL
Qty: 30 | Refills: 0
Start: 2020-09-23 | End: 2020-10-22

## 2020-09-23 RX ORDER — ACETAMINOPHEN 500 MG
2 TABLET ORAL
Qty: 0 | Refills: 0 | DISCHARGE
Start: 2020-09-23

## 2020-09-23 RX ORDER — ASPIRIN/CALCIUM CARB/MAGNESIUM 324 MG
1 TABLET ORAL
Qty: 30 | Refills: 0
Start: 2020-09-23 | End: 2020-10-22

## 2020-09-23 RX ADMIN — LISINOPRIL 40 MILLIGRAM(S): 2.5 TABLET ORAL at 06:24

## 2020-09-23 RX ADMIN — Medication 1: at 11:48

## 2020-09-23 RX ADMIN — HEPARIN SODIUM 5000 UNIT(S): 5000 INJECTION INTRAVENOUS; SUBCUTANEOUS at 06:24

## 2020-09-23 RX ADMIN — Medication 90 MILLIGRAM(S): at 06:24

## 2020-09-23 RX ADMIN — Medication 100 MILLIGRAM(S): at 17:20

## 2020-09-23 RX ADMIN — Medication 100 MILLIGRAM(S): at 06:24

## 2020-09-23 RX ADMIN — Medication 81 MILLIGRAM(S): at 11:48

## 2020-09-23 NOTE — PROGRESS NOTE ADULT - PROBLEM SELECTOR PLAN 2
reports hx of chronic eye partial vision loss 2/2 diabetes   A1c 6.3  hold oral DM medication   ISS  monitor FS

## 2020-09-23 NOTE — DISCHARGE NOTE NURSING/CASE MANAGEMENT/SOCIAL WORK - NSDCPNINST_GEN_ALL_CORE
please call your provider or go to the ER if you experience any chest pain, shortness of breath, uncontrolled pain, fever > 100.4, nausea/vomiting, any sudden increase in eye pain, any further changes in vision.

## 2020-09-23 NOTE — DISCHARGE NOTE NURSING/CASE MANAGEMENT/SOCIAL WORK - PATIENT PORTAL LINK FT
You can access the FollowMyHealth Patient Portal offered by [facility] by registering at the following website: [email or web address]. By joining [facility]’s FollowMyHealth portal, you will also be able to view your health information using other applications (apps) compatible with our system.

## 2020-09-23 NOTE — PROGRESS NOTE ADULT - PROBLEM SELECTOR PLAN 5
DVT ppx: Heparin SQ  Activity: Ambulate with assistance  Diet: Regular
DVT ppx: Heparin SQ  Activity: Ambulate with assistance  Diet: Dysphagia 1 for now, advance as tolerated
DVT ppx: Heparin SQ  Activity: Ambulate with assistance  Diet: Dysphagia 1 for now, advance as tolerated
DVT ppx: Heparin SQ  Activity: Ambulate with assistance  Diet: Regular

## 2020-09-23 NOTE — PROGRESS NOTE ADULT - ATTENDING COMMENTS
D/c home today if BP is improved D/C home today. Patient is medically stable for discharge. Spent 32 minutes in discharge planning including going over medication regimen with the patient, and speaking to patient's wife about medication regimen, follow up, contingency planning and ACP re discharge and med rec.

## 2020-09-23 NOTE — PROGRESS NOTE ADULT - PROBLEM SELECTOR PLAN 3
Elevated BPs initially, good response to labetalol IV and PO  monitor bp   patient s/p IV Labetalol  10mg x 1 + Labetalol 100mg x1   in ED 's   Repeat Bp 155/75  gradual normotension - will give IV fluids    patient takes Ramipril 10mg + Metoprolol 100mg BID, given symptoms started on Thursday night patient would not be a candidate for permissive HTN at this time, spoke with neuro, OK to start home meds, will therefore continue with his home BP medications with hold parameters
HTN urgency here, on Metop and Ramipril at home. Out of window for permissive HTN here.   - Cont. Metoprolol, Lisinopril, start Nifedipine
HTN urgency here, on Metop and Ramipril at home. Out of window for permissive HTN here.   - Cont. Metoprolol, Lisinopril, up-titrate Nifedipine.
HTN urgency here, on Metop and Ramipril at home. Out of window for permissive HTN here.   - Cont. Metoprolol, Lisinopril, up-titrate Nifedipine. Will switch Metop to Coreg tomorrow if still uncontrolled

## 2020-09-23 NOTE — PROGRESS NOTE ADULT - SUBJECTIVE AND OBJECTIVE BOX
Patient is a 75y old  Male who presents with a chief complaint of Left eye vision loss (20 Sep 2020 00:15)    SUBJECTIVE / OVERNIGHT EVENTS:   Patient only sees shadows in his L eye. Has trouble ambulating 2/2 vision loss. Has not been seen by OT.   Has help at home from his wife.     MEDICATIONS  (STANDING):  aspirin enteric coated 81 milliGRAM(s) Oral daily  dextrose 5%. 1000 milliLiter(s) (50 mL/Hr) IV Continuous <Continuous>  dextrose 50% Injectable 12.5 Gram(s) IV Push once  dextrose 50% Injectable 25 Gram(s) IV Push once  dextrose 50% Injectable 25 Gram(s) IV Push once  heparin   Injectable 5000 Unit(s) SubCutaneous every 12 hours  insulin lispro (HumaLOG) corrective regimen sliding scale   SubCutaneous three times a day before meals  insulin lispro (HumaLOG) corrective regimen sliding scale   SubCutaneous at bedtime  lisinopril 40 milliGRAM(s) Oral daily  metoprolol tartrate 100 milliGRAM(s) Oral two times a day  potassium chloride    Tablet ER 10 milliEquivalent(s) Oral once    MEDICATIONS  (PRN):  acetaminophen   Tablet .. 650 milliGRAM(s) Oral every 6 hours PRN Mild Pain (1 - 3), Moderate Pain (4 - 6)  dextrose 40% Gel 15 Gram(s) Oral once PRN Blood Glucose LESS THAN 70 milliGRAM(s)/deciliter  glucagon  Injectable 1 milliGRAM(s) IntraMuscular once PRN Glucose LESS THAN 70 milligrams/deciliter      Vital Signs Last 24 Hrs  T(C): 37.2 (20 Sep 2020 16:04), Max: 37.2 (20 Sep 2020 16:04)  T(F): 99 (20 Sep 2020 16:04), Max: 99 (20 Sep 2020 16:04)  HR: 72 (20 Sep 2020 16:04) (58 - 78)  BP: 203/89 (20 Sep 2020 16:04) (146/86 - 251/123)  BP(mean): --  RR: 17 (20 Sep 2020 16:04) (15 - 19)  SpO2: 99% (20 Sep 2020 16:04) (95% - 100%)  CAPILLARY BLOOD GLUCOSE      POCT Blood Glucose.: 143 mg/dL (20 Sep 2020 12:43)  POCT Blood Glucose.: 141 mg/dL (20 Sep 2020 09:13)    I&O's Summary    19 Sep 2020 07:01  -  20 Sep 2020 07:00  --------------------------------------------------------  IN: 0 mL / OUT: 100 mL / NET: -100 mL        PHYSICAL EXAM:  GENERAL: NAD, well-developed  HEENT: New left eye vision impairment, chronic rt eye vision impairment, able to see shadows/light, can track around the room  CHEST/LUNG: Clear to auscultation bilaterally; No wheeze  HEART: Regular rate and rhythm  ABDOMEN: Soft, Nontender, Nondistended  EXTREMITIES: no LE edema  PSYCH: Calm  NEUROLOGY: AAOx3  SKIN: No rashes or lesions    LABS:                        13.9   8.60  )-----------( 231      ( 21 Sep 2020 05:20 )             42.6     21 Sep 2020 05:20    141    |  102    |  17     ----------------------------<  165    3.9     |  22     |  1.19     Ca    9.8        21 Sep 2020 05:20  Mg     1.9       21 Sep 2020 05:20      PT/INR - ( 19 Sep 2020 17:10 )   PT: 12.3 SEC;   INR: 1.08          PTT - ( 19 Sep 2020 17:10 )  PTT:32.7 SEC    RADIOLOGY & ADDITIONAL TESTS:    Imaging Personally Reviewed:    Consultant(s) Notes Reviewed:      Care Discussed with Consultants/Other Providers:  
Patient is a 75y old  Male who presents with a chief complaint of Left eye vision loss (20 Sep 2020 00:15)    SUBJECTIVE / OVERNIGHT EVENTS:   Still only sees shadows, no change in her vision. No complaints currently. Eating and drinking, no F/C, cough, abdominal pain, diarrhea.     MEDICATIONS  (STANDING):  aspirin enteric coated 81 milliGRAM(s) Oral daily  dextrose 5%. 1000 milliLiter(s) (50 mL/Hr) IV Continuous <Continuous>  dextrose 50% Injectable 12.5 Gram(s) IV Push once  dextrose 50% Injectable 25 Gram(s) IV Push once  dextrose 50% Injectable 25 Gram(s) IV Push once  heparin   Injectable 5000 Unit(s) SubCutaneous every 12 hours  insulin lispro (HumaLOG) corrective regimen sliding scale   SubCutaneous three times a day before meals  insulin lispro (HumaLOG) corrective regimen sliding scale   SubCutaneous at bedtime  lisinopril 40 milliGRAM(s) Oral daily  metoprolol tartrate 100 milliGRAM(s) Oral two times a day  potassium chloride    Tablet ER 10 milliEquivalent(s) Oral once    MEDICATIONS  (PRN):  acetaminophen   Tablet .. 650 milliGRAM(s) Oral every 6 hours PRN Mild Pain (1 - 3), Moderate Pain (4 - 6)  dextrose 40% Gel 15 Gram(s) Oral once PRN Blood Glucose LESS THAN 70 milliGRAM(s)/deciliter  glucagon  Injectable 1 milliGRAM(s) IntraMuscular once PRN Glucose LESS THAN 70 milligrams/deciliter      Vital Signs Last 24 Hrs  T(C): 37.2 (20 Sep 2020 16:04), Max: 37.2 (20 Sep 2020 16:04)  T(F): 99 (20 Sep 2020 16:04), Max: 99 (20 Sep 2020 16:04)  HR: 72 (20 Sep 2020 16:04) (58 - 78)  BP: 203/89 (20 Sep 2020 16:04) (146/86 - 251/123)  BP(mean): --  RR: 17 (20 Sep 2020 16:04) (15 - 19)  SpO2: 99% (20 Sep 2020 16:04) (95% - 100%)  CAPILLARY BLOOD GLUCOSE      POCT Blood Glucose.: 143 mg/dL (20 Sep 2020 12:43)  POCT Blood Glucose.: 141 mg/dL (20 Sep 2020 09:13)    I&O's Summary    19 Sep 2020 07:01  -  20 Sep 2020 07:00  --------------------------------------------------------  IN: 0 mL / OUT: 100 mL / NET: -100 mL        PHYSICAL EXAM:  GENERAL: NAD, well-developed  HEENT: New left eye vision impairment, chronic rt eye vision impairment, able to see shadows/light, can track around the room  CHEST/LUNG: Clear to auscultation bilaterally; No wheeze  HEART: Regular rate and rhythm  ABDOMEN: Soft, Nontender, Nondistended  EXTREMITIES: no LE edema  PSYCH: Calm  NEUROLOGY: AAOx3  SKIN: No rashes or lesions     LABS:                        14.7   10.51 )-----------( 272      ( 23 Sep 2020 06:30 )             45.1     23 Sep 2020 06:30    140    |  102    |  21     ----------------------------<  163    3.9     |  20     |  1.19     Ca    9.6        23 Sep 2020 06:30  Phos  2.7       23 Sep 2020 06:30  Mg     2.1       23 Sep 2020 06:30            RADIOLOGY & ADDITIONAL TESTS:    Imaging Personally Reviewed:    Consultant(s) Notes Reviewed:      Care Discussed with Consultants/Other Providers:  
Patient is a 75y old  Male who presents with a chief complaint of Left eye vision loss (20 Sep 2020 00:15)    SUBJECTIVE / OVERNIGHT EVENTS: pt seen and examined at 1:25pm, no overnight events, Reports new left eye vision loss, has chronic rt eye vision loss, denies any  sob, chest pain or any other new complaints. No other new issues reported.    MEDICATIONS  (STANDING):  aspirin enteric coated 81 milliGRAM(s) Oral daily  dextrose 5%. 1000 milliLiter(s) (50 mL/Hr) IV Continuous <Continuous>  dextrose 50% Injectable 12.5 Gram(s) IV Push once  dextrose 50% Injectable 25 Gram(s) IV Push once  dextrose 50% Injectable 25 Gram(s) IV Push once  heparin   Injectable 5000 Unit(s) SubCutaneous every 12 hours  insulin lispro (HumaLOG) corrective regimen sliding scale   SubCutaneous three times a day before meals  insulin lispro (HumaLOG) corrective regimen sliding scale   SubCutaneous at bedtime  lisinopril 40 milliGRAM(s) Oral daily  metoprolol tartrate 100 milliGRAM(s) Oral two times a day  potassium chloride    Tablet ER 10 milliEquivalent(s) Oral once    MEDICATIONS  (PRN):  acetaminophen   Tablet .. 650 milliGRAM(s) Oral every 6 hours PRN Mild Pain (1 - 3), Moderate Pain (4 - 6)  dextrose 40% Gel 15 Gram(s) Oral once PRN Blood Glucose LESS THAN 70 milliGRAM(s)/deciliter  glucagon  Injectable 1 milliGRAM(s) IntraMuscular once PRN Glucose LESS THAN 70 milligrams/deciliter      Vital Signs Last 24 Hrs  T(C): 37.2 (20 Sep 2020 16:04), Max: 37.2 (20 Sep 2020 16:04)  T(F): 99 (20 Sep 2020 16:04), Max: 99 (20 Sep 2020 16:04)  HR: 72 (20 Sep 2020 16:04) (58 - 78)  BP: 203/89 (20 Sep 2020 16:04) (146/86 - 251/123)  BP(mean): --  RR: 17 (20 Sep 2020 16:04) (15 - 19)  SpO2: 99% (20 Sep 2020 16:04) (95% - 100%)  CAPILLARY BLOOD GLUCOSE      POCT Blood Glucose.: 143 mg/dL (20 Sep 2020 12:43)  POCT Blood Glucose.: 141 mg/dL (20 Sep 2020 09:13)    I&O's Summary    19 Sep 2020 07:01  -  20 Sep 2020 07:00  --------------------------------------------------------  IN: 0 mL / OUT: 100 mL / NET: -100 mL        PHYSICAL EXAM:  GENERAL: NAD, well-developed  HEENT: New left eye vision impairment, chronic rt eye vision impairment  CHEST/LUNG: Clear to auscultation bilaterally; No wheeze  HEART: Regular rate and rhythm  ABDOMEN: Soft, Nontender, Nondistended  EXTREMITIES: no LE edema  PSYCH: Calm  NEUROLOGY: AAOx3  SKIN: No rashes or lesions    LABS:                        12.3   8.64  )-----------( 221      ( 20 Sep 2020 06:14 )             38.6     09-20    139  |  102  |  18  ----------------------------<  132<H>  3.4<L>   |  20<L>  |  1.08    Ca    9.0      20 Sep 2020 06:14  Phos  3.5     09-20  Mg     1.9     09-20    TPro  7.7  /  Alb  4.7  /  TBili  0.6  /  DBili  x   /  AST  27  /  ALT  14  /  AlkPhos  72  09-19    PT/INR - ( 19 Sep 2020 17:10 )   PT: 12.3 SEC;   INR: 1.08          PTT - ( 19 Sep 2020 17:10 )  PTT:32.7 SEC          RADIOLOGY & ADDITIONAL TESTS:    Imaging Personally Reviewed:    Consultant(s) Notes Reviewed:      Care Discussed with Consultants/Other Providers:  
Patient is a 75y old  Male who presents with a chief complaint of Left eye vision loss (20 Sep 2020 00:15)    SUBJECTIVE / OVERNIGHT EVENTS:   Patient denies any change in his vision. Has no complaints, no headache, weakness, sensory changes. Has been eating and drinking    MEDICATIONS  (STANDING):  aspirin enteric coated 81 milliGRAM(s) Oral daily  dextrose 5%. 1000 milliLiter(s) (50 mL/Hr) IV Continuous <Continuous>  dextrose 50% Injectable 12.5 Gram(s) IV Push once  dextrose 50% Injectable 25 Gram(s) IV Push once  dextrose 50% Injectable 25 Gram(s) IV Push once  heparin   Injectable 5000 Unit(s) SubCutaneous every 12 hours  insulin lispro (HumaLOG) corrective regimen sliding scale   SubCutaneous three times a day before meals  insulin lispro (HumaLOG) corrective regimen sliding scale   SubCutaneous at bedtime  lisinopril 40 milliGRAM(s) Oral daily  metoprolol tartrate 100 milliGRAM(s) Oral two times a day  potassium chloride    Tablet ER 10 milliEquivalent(s) Oral once    MEDICATIONS  (PRN):  acetaminophen   Tablet .. 650 milliGRAM(s) Oral every 6 hours PRN Mild Pain (1 - 3), Moderate Pain (4 - 6)  dextrose 40% Gel 15 Gram(s) Oral once PRN Blood Glucose LESS THAN 70 milliGRAM(s)/deciliter  glucagon  Injectable 1 milliGRAM(s) IntraMuscular once PRN Glucose LESS THAN 70 milligrams/deciliter      Vital Signs Last 24 Hrs  T(C): 37.2 (20 Sep 2020 16:04), Max: 37.2 (20 Sep 2020 16:04)  T(F): 99 (20 Sep 2020 16:04), Max: 99 (20 Sep 2020 16:04)  HR: 72 (20 Sep 2020 16:04) (58 - 78)  BP: 203/89 (20 Sep 2020 16:04) (146/86 - 251/123)  BP(mean): --  RR: 17 (20 Sep 2020 16:04) (15 - 19)  SpO2: 99% (20 Sep 2020 16:04) (95% - 100%)  CAPILLARY BLOOD GLUCOSE      POCT Blood Glucose.: 143 mg/dL (20 Sep 2020 12:43)  POCT Blood Glucose.: 141 mg/dL (20 Sep 2020 09:13)    I&O's Summary    19 Sep 2020 07:01  -  20 Sep 2020 07:00  --------------------------------------------------------  IN: 0 mL / OUT: 100 mL / NET: -100 mL        PHYSICAL EXAM:  GENERAL: NAD, well-developed  HEENT: New left eye vision impairment, chronic rt eye vision impairment, able to see shadows/light, can track around the room  CHEST/LUNG: Clear to auscultation bilaterally; No wheeze  HEART: Regular rate and rhythm  ABDOMEN: Soft, Nontender, Nondistended  EXTREMITIES: no LE edema  PSYCH: Calm  NEUROLOGY: AAOx3  SKIN: No rashes or lesions               LABS:                        13.8   9.23  )-----------( 253      ( 22 Sep 2020 06:40 )             43.7     22 Sep 2020 06:40    141    |  104    |  19     ----------------------------<  148    3.5     |  21     |  1.11     Ca    9.4        22 Sep 2020 06:40          RADIOLOGY & ADDITIONAL TESTS:    Imaging Personally Reviewed:    Consultant(s) Notes Reviewed:      Care Discussed with Consultants/Other Providers:

## 2020-09-23 NOTE — PROGRESS NOTE ADULT - PROBLEM SELECTOR PROBLEM 2
Type 2 diabetes mellitus with other ophthalmic complication, without long-term current use of insulin

## 2020-09-23 NOTE — PROGRESS NOTE ADULT - ASSESSMENT
74 yo M PMHx T2DM, HTN, R eye partial vision loss (was told 2/2 DM - 2015) presenting with c/o new onset painless Left eye vision loss x 2 days
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

## 2020-09-24 PROBLEM — I10 ESSENTIAL (PRIMARY) HYPERTENSION: Chronic | Status: ACTIVE | Noted: 2020-09-19

## 2020-09-24 PROBLEM — E11.9 TYPE 2 DIABETES MELLITUS WITHOUT COMPLICATIONS: Chronic | Status: ACTIVE | Noted: 2020-09-19

## 2020-10-01 ENCOUNTER — APPOINTMENT (OUTPATIENT)
Dept: INTERNAL MEDICINE | Facility: CLINIC | Age: 75
End: 2020-10-01

## 2020-10-06 ENCOUNTER — APPOINTMENT (OUTPATIENT)
Dept: INTERNAL MEDICINE | Facility: CLINIC | Age: 75
End: 2020-10-06
Payer: MEDICARE

## 2020-10-06 VITALS
SYSTOLIC BLOOD PRESSURE: 134 MMHG | WEIGHT: 119 LBS | OXYGEN SATURATION: 98 % | DIASTOLIC BLOOD PRESSURE: 76 MMHG | TEMPERATURE: 98.1 F | HEART RATE: 65 BPM | HEIGHT: 64 IN | BODY MASS INDEX: 20.32 KG/M2

## 2020-10-06 VITALS — DIASTOLIC BLOOD PRESSURE: 75 MMHG | SYSTOLIC BLOOD PRESSURE: 130 MMHG

## 2020-10-06 DIAGNOSIS — Z09 ENCOUNTER FOR FOLLOW-UP EXAMINATION AFTER COMPLETED TREATMENT FOR CONDITIONS OTHER THAN MALIGNANT NEOPLASM: ICD-10-CM

## 2020-10-06 PROCEDURE — G0008: CPT

## 2020-10-06 PROCEDURE — G0439: CPT | Mod: 25

## 2020-10-06 PROCEDURE — 99214 OFFICE O/P EST MOD 30 MIN: CPT | Mod: 25

## 2020-10-06 PROCEDURE — 90662 IIV NO PRSV INCREASED AG IM: CPT

## 2020-10-06 RX ORDER — KRILL/OM-3/DHA/EPA/PHOSPHO/AST 1000-230MG
81 CAPSULE ORAL
Refills: 0 | Status: ACTIVE | COMMUNITY

## 2020-10-06 NOTE — HEALTH RISK ASSESSMENT
[No] : In the past 12 months have you used drugs other than those required for medical reasons? No [Patient declined colonoscopy] : Patient declined colonoscopy [None] : None [With Family] : lives with family [Retired] : retired [] :  [# Of Children ___] : has [unfilled] children [Feels Safe at Home] : Feels safe at home [Independent] : feeding [Some assistance needed] : managing medications [Reports changes in vision] : Reports changes in vision [] : No [Change in mental status noted] : No change in mental status noted [Language] : denies difficulty with language [Reports changes in hearing] : Reports no changes in hearing [Reports changes in dental health] : Reports no changes in dental health [ColonoscopyComments] : never had [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

## 2020-10-06 NOTE — PLAN
[FreeTextEntry1] : Hospital Follow up\par -discharged from [facility] 9/23 \par -HTN urgency- BP better in office today, wife notes increased AM readings at home- we discussed changing timing for when he takes nifedipine for  better control throughout the day but patient is hesitant to make changes. Will keep same regimen for now and he will return in 1 month for follow up, continue home monitoring\par -continue daily aspirin \par -s/p follow up with retinal specialist last week\par -labs reviewed from hospitalization\par \par DM type II\par -A1C inpatient 6.3%\par -continue metformin\par -add statin, recent LDL 90- goal for diabetic <70\par \par Follow up in 1 month\par Flu shot today\par Declines colonoscopy- will discuss stool test at next visit\par PNA vaccine next visit\par \par

## 2020-10-06 NOTE — HISTORY OF PRESENT ILLNESS
[Spouse] : spouse [FreeTextEntry1] : establish care, hospital follow up [de-identified] : 74yo M with PMH of HTN, DM type II, essential tremor presents to [facility] care, hospital follow up; accompanied by his wife today. He was recently admitted to [facility] from 9/19-9/23 for left sided acute vision loss. He states that he suddenly noticed darkness in left vision field, waiting until the next day to tell his wife. 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 There he had neurologic and cardiac workup, admitted also for hypertensive urgency. He was stabilized and discharged home after BP control. \par Since discharge he is feeling okay, he has not regained sight in his left eye completely. He went to retinal specialist for follow up past Friday, was told that he may not regain his vision at all. He currently had a very blurry visual field, states he already had right sided vision changes from prior likely 2/2 diabetes and HTN. He is able to walk around the house and just outside in his yard. Wife states he places medications and food in front of him, he feels for them and then self administers and feeds himself. He is independent with ambulation, needs to be guided when not at home due to vision loss. He denies any HA, dizziness, CP, palp, SOB, VALDERRAMA, abd pain, N/V/D, urinary concerns.

## 2020-10-06 NOTE — PHYSICAL EXAM
[No Acute Distress] : no acute distress [Well Nourished] : well nourished [Well Developed] : well developed [Well-Appearing] : well-appearing [No Respiratory Distress] : no respiratory distress  [No Accessory Muscle Use] : no accessory muscle use [Clear to Auscultation] : lungs were clear to auscultation bilaterally [Normal Rate] : normal rate  [Regular Rhythm] : with a regular rhythm [Normal S1, S2] : normal S1 and S2 [No Edema] : there was no peripheral edema [Soft] : abdomen soft [Non Tender] : non-tender [Non-distended] : non-distended [Normal Bowel Sounds] : normal bowel sounds [No CVA Tenderness] : no CVA  tenderness [Grossly Normal Strength/Tone] : grossly normal strength/tone [Coordination Grossly Intact] : coordination grossly intact [No Focal Deficits] : no focal deficits [Normal Gait] : normal gait [Normal Affect] : the affect was normal [Alert and Oriented x3] : oriented to person, place, and time [Normal Insight/Judgement] : insight and judgment were intact [de-identified] : tremor noted on exam

## 2020-10-06 NOTE — REVIEW OF SYSTEMS
[Vision Problems] : vision problems [Negative] : Psychiatric [FreeTextEntry3] : vision loss [de-identified] : tremor

## 2020-11-06 ENCOUNTER — APPOINTMENT (OUTPATIENT)
Dept: INTERNAL MEDICINE | Facility: CLINIC | Age: 75
End: 2020-11-06
Payer: MEDICARE

## 2020-11-06 VITALS
HEIGHT: 64 IN | TEMPERATURE: 98.1 F | OXYGEN SATURATION: 98 % | WEIGHT: 128 LBS | DIASTOLIC BLOOD PRESSURE: 78 MMHG | SYSTOLIC BLOOD PRESSURE: 128 MMHG | HEART RATE: 77 BPM | BODY MASS INDEX: 21.85 KG/M2

## 2020-11-06 LAB
ALBUMIN SERPL ELPH-MCNC: 5 G/DL
ALP BLD-CCNC: 77 U/L
ALT SERPL-CCNC: 33 U/L
ANION GAP SERPL CALC-SCNC: 13 MMOL/L
AST SERPL-CCNC: 35 U/L
BILIRUB SERPL-MCNC: 0.6 MG/DL
BUN SERPL-MCNC: 23 MG/DL
CALCIUM SERPL-MCNC: 9.8 MG/DL
CHLORIDE SERPL-SCNC: 101 MMOL/L
CHOLEST SERPL-MCNC: 119 MG/DL
CO2 SERPL-SCNC: 24 MMOL/L
CREAT SERPL-MCNC: 1.29 MG/DL
GLUCOSE SERPL-MCNC: 170 MG/DL
HDLC SERPL-MCNC: 61 MG/DL
LDLC SERPL CALC-MCNC: 41 MG/DL
NONHDLC SERPL-MCNC: 58 MG/DL
POTASSIUM SERPL-SCNC: 4.6 MMOL/L
PROT SERPL-MCNC: 7.1 G/DL
SODIUM SERPL-SCNC: 139 MMOL/L
TRIGL SERPL-MCNC: 85 MG/DL

## 2020-11-06 PROCEDURE — 99214 OFFICE O/P EST MOD 30 MIN: CPT | Mod: 25

## 2020-11-06 PROCEDURE — 36415 COLL VENOUS BLD VENIPUNCTURE: CPT

## 2020-11-06 PROCEDURE — 99072 ADDL SUPL MATRL&STAF TM PHE: CPT

## 2020-11-06 PROCEDURE — G0009: CPT

## 2020-11-06 PROCEDURE — 90732 PPSV23 VACC 2 YRS+ SUBQ/IM: CPT

## 2020-11-06 NOTE — PLAN
[FreeTextEntry1] : HTN\par -better controlled\par -continue current meds\par \par DM II\par -check labs, started statin last visit\par -continue meds\par \par Pneumovax today

## 2020-11-06 NOTE — HISTORY OF PRESENT ILLNESS
[de-identified] : Patient here for follow up visit.\par He has been doing well overall, his home BPs have been significantly improved since last visit.

## 2020-11-09 LAB
BASOPHILS # BLD AUTO: 0.14 K/UL
BASOPHILS NFR BLD AUTO: 1.5 %
EOSINOPHIL # BLD AUTO: 0.23 K/UL
EOSINOPHIL NFR BLD AUTO: 2.5 %
ESTIMATED AVERAGE GLUCOSE: 148 MG/DL
HBA1C MFR BLD HPLC: 6.8 %
HCT VFR BLD CALC: 41 %
HGB BLD-MCNC: 13.5 G/DL
IMM GRANULOCYTES NFR BLD AUTO: 0.8 %
LYMPHOCYTES # BLD AUTO: 2.97 K/UL
LYMPHOCYTES NFR BLD AUTO: 32.2 %
MAN DIFF?: NORMAL
MCHC RBC-ENTMCNC: 28.2 PG
MCHC RBC-ENTMCNC: 32.9 GM/DL
MCV RBC AUTO: 85.6 FL
MONOCYTES # BLD AUTO: 0.75 K/UL
MONOCYTES NFR BLD AUTO: 8.1 %
NEUTROPHILS # BLD AUTO: 5.06 K/UL
NEUTROPHILS NFR BLD AUTO: 54.9 %
PLATELET # BLD AUTO: 285 K/UL
RBC # BLD: 4.79 M/UL
RBC # FLD: 13.2 %
WBC # FLD AUTO: 9.22 K/UL

## 2020-11-24 ENCOUNTER — RX RENEWAL (OUTPATIENT)
Age: 75
End: 2020-11-24

## 2021-02-24 RX ORDER — METFORMIN HYDROCHLORIDE 500 MG/1
500 TABLET, COATED ORAL
Qty: 270 | Refills: 3 | Status: DISCONTINUED | COMMUNITY
Start: 1900-01-01 | End: 2021-02-24

## 2021-03-24 ENCOUNTER — APPOINTMENT (OUTPATIENT)
Dept: INTERNAL MEDICINE | Facility: CLINIC | Age: 76
End: 2021-03-24
Payer: MEDICARE

## 2021-03-24 VITALS — SYSTOLIC BLOOD PRESSURE: 140 MMHG | HEART RATE: 56 BPM | DIASTOLIC BLOOD PRESSURE: 70 MMHG

## 2021-03-24 VITALS
WEIGHT: 128 LBS | BODY MASS INDEX: 21.85 KG/M2 | DIASTOLIC BLOOD PRESSURE: 79 MMHG | OXYGEN SATURATION: 99 % | HEIGHT: 64 IN | SYSTOLIC BLOOD PRESSURE: 167 MMHG | TEMPERATURE: 98.1 F | HEART RATE: 54 BPM

## 2021-03-24 PROCEDURE — 36415 COLL VENOUS BLD VENIPUNCTURE: CPT

## 2021-03-24 PROCEDURE — 99072 ADDL SUPL MATRL&STAF TM PHE: CPT

## 2021-03-24 PROCEDURE — 99214 OFFICE O/P EST MOD 30 MIN: CPT | Mod: 25

## 2021-03-24 NOTE — HISTORY OF PRESENT ILLNESS
[Spouse] : spouse [de-identified] : Patient here for follow up visit.\par In the past week or so he notes his afternoon BPs are a bit higher than before. Denies any complaints, no HA/CP/palp. He goes on the treadmill for 45 minutes per day, no changes in stamina.

## 2021-03-24 NOTE — PLAN
[FreeTextEntry1] : HTN\par -advised to continue monitoring BP and HR at home\par -no changes to medication today\par -check electrolytes\par \par DM\par -continue metformin\par -check A1C today\par \par Pneumovax 11/20\par Completed COVID vaccination series

## 2021-03-25 LAB
ALBUMIN SERPL ELPH-MCNC: 4.8 G/DL
ALP BLD-CCNC: 79 U/L
ALT SERPL-CCNC: 26 U/L
ANION GAP SERPL CALC-SCNC: 12 MMOL/L
AST SERPL-CCNC: 27 U/L
BILIRUB SERPL-MCNC: 0.4 MG/DL
BUN SERPL-MCNC: 21 MG/DL
CALCIUM SERPL-MCNC: 9.7 MG/DL
CHLORIDE SERPL-SCNC: 103 MMOL/L
CO2 SERPL-SCNC: 24 MMOL/L
CREAT SERPL-MCNC: 1.18 MG/DL
ESTIMATED AVERAGE GLUCOSE: 137 MG/DL
GLUCOSE SERPL-MCNC: 195 MG/DL
HBA1C MFR BLD HPLC: 6.4 %
POTASSIUM SERPL-SCNC: 4.6 MMOL/L
PROT SERPL-MCNC: 7.3 G/DL
SODIUM SERPL-SCNC: 140 MMOL/L

## 2021-04-07 ENCOUNTER — TRANSCRIPTION ENCOUNTER (OUTPATIENT)
Age: 76
End: 2021-04-07

## 2021-04-14 ENCOUNTER — RX RENEWAL (OUTPATIENT)
Age: 76
End: 2021-04-14

## 2021-06-16 ENCOUNTER — RX RENEWAL (OUTPATIENT)
Age: 76
End: 2021-06-16

## 2021-07-19 ENCOUNTER — TRANSCRIPTION ENCOUNTER (OUTPATIENT)
Age: 76
End: 2021-07-19

## 2021-08-26 ENCOUNTER — RX RENEWAL (OUTPATIENT)
Age: 76
End: 2021-08-26

## 2021-08-30 ENCOUNTER — TRANSCRIPTION ENCOUNTER (OUTPATIENT)
Age: 76
End: 2021-08-30

## 2021-10-04 ENCOUNTER — TRANSCRIPTION ENCOUNTER (OUTPATIENT)
Age: 76
End: 2021-10-04

## 2021-10-05 ENCOUNTER — TRANSCRIPTION ENCOUNTER (OUTPATIENT)
Age: 76
End: 2021-10-05

## 2021-12-10 ENCOUNTER — RX RENEWAL (OUTPATIENT)
Age: 76
End: 2021-12-10

## 2021-12-10 ENCOUNTER — APPOINTMENT (OUTPATIENT)
Dept: INTERNAL MEDICINE | Facility: CLINIC | Age: 76
End: 2021-12-10
Payer: MEDICARE

## 2021-12-10 ENCOUNTER — NON-APPOINTMENT (OUTPATIENT)
Age: 76
End: 2021-12-10

## 2021-12-10 VITALS
WEIGHT: 122 LBS | OXYGEN SATURATION: 99 % | SYSTOLIC BLOOD PRESSURE: 148 MMHG | DIASTOLIC BLOOD PRESSURE: 76 MMHG | HEIGHT: 64 IN | TEMPERATURE: 98.1 F | HEART RATE: 59 BPM | BODY MASS INDEX: 20.83 KG/M2

## 2021-12-10 VITALS — SYSTOLIC BLOOD PRESSURE: 149 MMHG | DIASTOLIC BLOOD PRESSURE: 76 MMHG

## 2021-12-10 DIAGNOSIS — Z92.29 PERSONAL HISTORY OF OTHER DRUG THERAPY: ICD-10-CM

## 2021-12-10 DIAGNOSIS — Z82.49 FAMILY HISTORY OF ISCHEMIC HEART DISEASE AND OTHER DISEASES OF THE CIRCULATORY SYSTEM: ICD-10-CM

## 2021-12-10 DIAGNOSIS — Z78.9 OTHER SPECIFIED HEALTH STATUS: ICD-10-CM

## 2021-12-10 PROCEDURE — 90670 PCV13 VACCINE IM: CPT

## 2021-12-10 PROCEDURE — G0439: CPT

## 2021-12-10 PROCEDURE — G0009: CPT

## 2021-12-10 PROCEDURE — 93000 ELECTROCARDIOGRAM COMPLETE: CPT

## 2021-12-10 NOTE — DATA REVIEWED
[FreeTextEntry1] : EKG - SB, R - 55, axis - (-)31, 1st degree AVB, LAD,  no STTW abnormality.  Prior EKG not available for comparison.

## 2021-12-10 NOTE — PHYSICAL EXAM
[Declined Rectal Exam] : declined rectal exam [No Acute Distress] : no acute distress [Well Nourished] : well nourished [Well Developed] : well developed [Normal Sclera/Conjunctiva] : normal sclera/conjunctiva [PERRL] : pupils equal round and reactive to light [EOMI] : extraocular movements intact [Normal Outer Ear/Nose] : the outer ears and nose were normal in appearance [Normal Oropharynx] : the oropharynx was normal [Normal TMs] : both tympanic membranes were normal [Normal Nasal Mucosa] : the nasal mucosa was normal [No JVD] : no jugular venous distention [No Lymphadenopathy] : no lymphadenopathy [Supple] : supple [No Respiratory Distress] : no respiratory distress  [Clear to Auscultation] : lungs were clear to auscultation bilaterally [Normal Rate] : normal rate  [Regular Rhythm] : with a regular rhythm [Normal S1, S2] : normal S1 and S2 [No Carotid Bruits] : no carotid bruits [Pedal Pulses Present] : the pedal pulses are present [No Edema] : there was no peripheral edema [No Extremity Clubbing/Cyanosis] : no extremity clubbing/cyanosis [Normal Appearance] : normal in appearance [No Masses] : no palpable masses [No Nipple Discharge] : no nipple discharge [No Axillary Lymphadenopathy] : no axillary lymphadenopathy [Soft] : abdomen soft [Non Tender] : non-tender [Non-distended] : non-distended [Normal Bowel Sounds] : normal bowel sounds [Normal Supraclavicular Nodes] : no supraclavicular lymphadenopathy [Normal Axillary Nodes] : no axillary lymphadenopathy [Normal Posterior Cervical Nodes] : no posterior cervical lymphadenopathy [Normal Anterior Cervical Nodes] : no anterior cervical lymphadenopathy [No CVA Tenderness] : no CVA  tenderness [No Spinal Tenderness] : no spinal tenderness [No Joint Swelling] : no joint swelling [Grossly Normal Strength/Tone] : grossly normal strength/tone [No Rash] : no rash [Coordination Grossly Intact] : coordination grossly intact [No Focal Deficits] : no focal deficits [Normal Gait] : normal gait [Speech Grossly Normal] : speech grossly normal [Normal Affect] : the affect was normal [Alert and Oriented x3] : oriented to person, place, and time [Normal Mood] : the mood was normal [Normal Insight/Judgement] : insight and judgment were intact [de-identified] : male in stated age,  [de-identified] : Very poor vision, [FreeTextEntry1] : deferred, pt declined,

## 2021-12-10 NOTE — HEALTH RISK ASSESSMENT
[Very Good] : ~his/her~  mood as very good [Yes] : Yes [2 - 4 times a month (2 pts)] : 2-4 times a month (2 points) [1 or 2 (0 pts)] : 1 or 2 (0 points) [Never (0 pts)] : Never (0 points) [No] : In the past 12 months have you used drugs other than those required for medical reasons? No [No falls in past year] : Patient reported no falls in the past year [0] : 2) Feeling down, depressed, or hopeless: Not at all (0) [PHQ-2 Negative - No further assessment needed] : PHQ-2 Negative - No further assessment needed [Patient declined colonoscopy] : Patient declined colonoscopy [None] : None [With Family] : lives with family [# of Members in Household ___] :  household currently consist of [unfilled] member(s) [Retired] : retired [Graduate School] : graduate school [] :  [# Of Children ___] : has [unfilled] children [Feels Safe at Home] : Feels safe at home [Fully functional (bathing, dressing, toileting, transferring, walking, feeding)] : Fully functional (bathing, dressing, toileting, transferring, walking, feeding) [Fully functional (using the telephone, shopping, preparing meals, housekeeping, doing laundry, using] : Fully functional and needs no help or supervision to perform IADLs (using the telephone, shopping, preparing meals, housekeeping, doing laundry, using transportation, managing medications and managing finances) [Reports changes in vision] : Reports changes in vision [Smoke Detector] : smoke detector [Carbon Monoxide Detector] : carbon monoxide detector [Seat Belt] :  uses seat belt [With Patient/Caregiver] : , with patient/caregiver [] : No [de-identified] : exercises on the treadmill for 40 minutes 7 days per week [[id number]] : 0 [EyeExamDate] : 10/21 [Change in mental status noted] : No change in mental status noted [Reports changes in hearing] : Reports no changes in hearing [Reports changes in dental health] : Reports no changes in dental health [ColonoscopyDate] : Never [de-identified] : No driving or housework due to poor vision, 9/2020 [de-identified] : dentist - 5 years ago [AdvancecareDate] : 12/21

## 2021-12-10 NOTE — HISTORY OF PRESENT ILLNESS
[Spouse] : spouse [FreeTextEntry1] : Pt presented for PE.  Last PE was 10/2020.  His PCP is no longer available. [de-identified] : His/Her health was uneventful since last visit, he/she has no new complaint. \par \par Pt was well and did not get sick throughout the COVID pandemic.   He had COVID vaccine and tolerated it well..  He had flu vaccine at the pharmacy.  He had Pneumovax vaccine last year and would like to get Prevnar vaccine today.

## 2021-12-10 NOTE — REVIEW OF SYSTEMS
[Negative] : Heme/Lymph [Constipation] : constipation [Fever] : no fever [Chills] : no chills [Fatigue] : no fatigue [Recent Change In Weight] : ~T no recent weight change [Chest Pain] : no chest pain [Palpitations] : no palpitations [Lower Ext Edema] : no lower extremity edema [Shortness Of Breath] : no shortness of breath [Wheezing] : no wheezing [Cough] : no cough [Dyspnea on Exertion] : not dyspnea on exertion [Abdominal Pain] : no abdominal pain [Nausea] : no nausea [Diarrhea] : no diarrhea [Vomiting] : no vomiting [Heartburn] : no heartburn [Melena] : no melena [Dysuria] : no dysuria [Incontinence] : no incontinence [Nocturia] : no nocturia [Hematuria] : no hematuria [Joint Pain] : no joint pain [Joint Stiffness] : no joint stiffness [Muscle Pain] : no muscle pain [Back Pain] : no back pain [Itching] : no itching [Mole Changes] : no mole changes [Skin Rash] : no skin rash [Headache] : no headache [Dizziness] : no dizziness [Fainting] : no fainting [Unsteady Walk] : no ataxia [Insomnia] : no insomnia [Anxiety] : no anxiety [Depression] : no depression [Easy Bleeding] : no easy bleeding [Easy Bruising] : no easy bruising [Swollen Glands] : no swollen glands [FreeTextEntry3] : P [FreeTextEntry7] : Pt has 1 BM every 2 days,

## 2021-12-15 LAB
25(OH)D3 SERPL-MCNC: 29.9 NG/ML
ALBUMIN SERPL ELPH-MCNC: 5.2 G/DL
ALP BLD-CCNC: 83 U/L
ALT SERPL-CCNC: 23 U/L
ANION GAP SERPL CALC-SCNC: 14 MMOL/L
APPEARANCE: CLEAR
AST SERPL-CCNC: 26 U/L
BASOPHILS # BLD AUTO: 0.12 K/UL
BASOPHILS NFR BLD AUTO: 1.3 %
BILIRUB SERPL-MCNC: 0.5 MG/DL
BILIRUBIN URINE: NEGATIVE
BLOOD URINE: NEGATIVE
BUN SERPL-MCNC: 19 MG/DL
CALCIUM SERPL-MCNC: 10 MG/DL
CHLORIDE SERPL-SCNC: 105 MMOL/L
CHOLEST SERPL-MCNC: 129 MG/DL
CK SERPL-CCNC: 115 U/L
CO2 SERPL-SCNC: 24 MMOL/L
COLOR: NORMAL
CREAT SERPL-MCNC: 1.23 MG/DL
EOSINOPHIL # BLD AUTO: 0.44 K/UL
EOSINOPHIL NFR BLD AUTO: 4.9 %
ESTIMATED AVERAGE GLUCOSE: 131 MG/DL
GLUCOSE QUALITATIVE U: NEGATIVE
GLUCOSE SERPL-MCNC: 135 MG/DL
HBA1C MFR BLD HPLC: 6.2 %
HCT VFR BLD CALC: 41.2 %
HDLC SERPL-MCNC: 69 MG/DL
HGB BLD-MCNC: 13.1 G/DL
IMM GRANULOCYTES NFR BLD AUTO: 0.3 %
KETONES URINE: NEGATIVE
LDLC SERPL CALC-MCNC: 43 MG/DL
LEUKOCYTE ESTERASE URINE: NEGATIVE
LYMPHOCYTES # BLD AUTO: 3.78 K/UL
LYMPHOCYTES NFR BLD AUTO: 42.4 %
MAN DIFF?: NORMAL
MCHC RBC-ENTMCNC: 27.8 PG
MCHC RBC-ENTMCNC: 31.8 GM/DL
MCV RBC AUTO: 87.5 FL
MONOCYTES # BLD AUTO: 0.84 K/UL
MONOCYTES NFR BLD AUTO: 9.4 %
NEUTROPHILS # BLD AUTO: 3.71 K/UL
NEUTROPHILS NFR BLD AUTO: 41.7 %
NITRITE URINE: NEGATIVE
NONHDLC SERPL-MCNC: 60 MG/DL
PH URINE: 6.5
PLATELET # BLD AUTO: 249 K/UL
POTASSIUM SERPL-SCNC: 4.8 MMOL/L
PROT SERPL-MCNC: 7.6 G/DL
PROTEIN URINE: NORMAL
PSA SERPL-MCNC: 15.6 NG/ML
RBC # BLD: 4.71 M/UL
RBC # FLD: 13.3 %
SODIUM SERPL-SCNC: 142 MMOL/L
SPECIFIC GRAVITY URINE: 1.02
TRIGL SERPL-MCNC: 83 MG/DL
UROBILINOGEN URINE: NORMAL
WBC # FLD AUTO: 8.92 K/UL

## 2022-01-20 ENCOUNTER — APPOINTMENT (OUTPATIENT)
Dept: UROLOGY | Facility: CLINIC | Age: 77
End: 2022-01-20
Payer: MEDICARE

## 2022-01-20 VITALS
WEIGHT: 125 LBS | HEIGHT: 64 IN | BODY MASS INDEX: 21.34 KG/M2 | SYSTOLIC BLOOD PRESSURE: 155 MMHG | DIASTOLIC BLOOD PRESSURE: 74 MMHG | HEART RATE: 64 BPM

## 2022-01-20 DIAGNOSIS — Z78.9 OTHER SPECIFIED HEALTH STATUS: ICD-10-CM

## 2022-01-20 DIAGNOSIS — Z87.898 PERSONAL HISTORY OF OTHER SPECIFIED CONDITIONS: ICD-10-CM

## 2022-01-20 PROCEDURE — 99204 OFFICE O/P NEW MOD 45 MIN: CPT

## 2022-01-20 NOTE — ADDENDUM
[FreeTextEntry1] : Entered by Álvaro Fernandez, acting as scribe for Dr. Omar Flores.\par \par The documentation recorded by the scribe accurately reflects the service I personally performed and the decisions made by me.

## 2022-01-20 NOTE — HISTORY OF PRESENT ILLNESS
[FreeTextEntry1] : Please refer to URO Consult note \par \par New elevated PSA.  PSA is 15.6.  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  Prostate MRI.

## 2022-01-20 NOTE — LETTER BODY
[FreeTextEntry1] : Emily Tuttle MD\par [address] [facility], [address], \par [place], [place]\par [phone]\par \par Dear Dr. 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 Patient reports that he has not had previous prostate biopsy. His current PSA is 15.6.  Patient denies any hematuria or lower urinary tract symptoms. He denies any pain.The patient denies any aggravating or relieving factors. The patient denies any interference of function. The patient is entirely asymptomatic. All other review of systems are negative. He has no cancer in his family medical history. He has no previous surgical history. Past medical history, family history and social history were inquired and were noncontributory to current condition. The patient drinks alcohol. Patient currently smokes. I encourage the patient to stop smoking and seek smoking cessation programs. I discuss with him to potential long term complications and health effects from smoking. I gave the patient additional information including the [facility] Refer-to-Quit program. I spent over 3 minutes counseling the patient regarding smoking cessation.  Medications and allergies were reviewed. He has no known allergies to medication. \par \par On examination, the patient is an older-appearing gentleman in no acute distress. He is alert and oriented follows commands. He has normal mood and affect. He is normocephalic. Neck is supple. Oral no thrush. Respirations are unlabored. His abdomen is soft and nontender. Bladder is nonpalpable. No CVA tenderness. Neurologically he is grossly intact. No peripheral edema. Skin without gross abnormality. He has normal male external genitalia. Normal meatus. Bilateral testes are descended intrascrotally and normal to palpation. On rectal examination, there is normal sphincter tone. The prostate is clinically benign without focal induration or nodularity.\par \par Assessment: Elevated PSA.\par \par I counseled the patient.  I discussed with him the risk of occult malignancy. I discussed the various etiologies of PSA elevation, including enlargement of prostate, inflammation or infection, and prostate cancer. Patient would like to confirm the diagnosis with repeating the PSA. He will also obtain BMP. If his PSA remains elevated, then he will undergo prostate MRI. I counseled the patient regarding the procedure. The risks and benefits were discussed. Alternatives were given. I answered the patient questions. The patient will take the necessary preparations for the procedure, including fleet edema. The patient will follow-up as directed and will contact me with any questions or concerns. Thank you for the opportunity to participate in the care of Mr. BRAMBILA. I will keep you updated on his progress. \par \par Plan: Repeat PSA. BMP. Prostate MRI. Fleet edema. Follow up as directed.

## 2022-01-21 LAB
ANION GAP SERPL CALC-SCNC: 18 MMOL/L
BUN SERPL-MCNC: 17 MG/DL
CALCIUM SERPL-MCNC: 10 MG/DL
CHLORIDE SERPL-SCNC: 100 MMOL/L
CO2 SERPL-SCNC: 20 MMOL/L
CREAT SERPL-MCNC: 1.17 MG/DL
GLUCOSE SERPL-MCNC: 139 MG/DL
POTASSIUM SERPL-SCNC: 4.6 MMOL/L
PSA FREE FLD-MCNC: 15 %
PSA FREE SERPL-MCNC: 2.26 NG/ML
PSA SERPL-MCNC: 14.8 NG/ML
SODIUM SERPL-SCNC: 138 MMOL/L

## 2022-01-23 ENCOUNTER — RESULT REVIEW (OUTPATIENT)
Age: 77
End: 2022-01-23

## 2022-01-23 ENCOUNTER — APPOINTMENT (OUTPATIENT)
Dept: MRI IMAGING | Facility: IMAGING CENTER | Age: 77
End: 2022-01-23
Payer: MEDICARE

## 2022-01-23 ENCOUNTER — OUTPATIENT (OUTPATIENT)
Dept: OUTPATIENT SERVICES | Facility: HOSPITAL | Age: 77
LOS: 1 days | End: 2022-01-23
Payer: MEDICARE

## 2022-01-23 DIAGNOSIS — R97.20 ELEVATED PROSTATE SPECIFIC ANTIGEN [PSA]: ICD-10-CM

## 2022-01-23 PROCEDURE — 72197 MRI PELVIS W/O & W/DYE: CPT

## 2022-01-23 PROCEDURE — A9585: CPT

## 2022-01-23 PROCEDURE — 76498 UNLISTED MR PROCEDURE: CPT

## 2022-01-23 PROCEDURE — 76498P: CUSTOM | Mod: 26

## 2022-01-23 PROCEDURE — 72197 MRI PELVIS W/O & W/DYE: CPT | Mod: 26

## 2022-01-27 ENCOUNTER — APPOINTMENT (OUTPATIENT)
Dept: UROLOGY | Facility: CLINIC | Age: 77
End: 2022-01-27
Payer: MEDICARE

## 2022-01-27 PROCEDURE — 99214 OFFICE O/P EST MOD 30 MIN: CPT

## 2022-01-27 NOTE — LETTER BODY
[FreeTextEntry1] : Emily Tuttle MD\par [address] [facility], [address], \par [place], [place]\par [phone]\par \par Dear Dr. 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 Patient reports that he has not had previous prostate biopsy. He returns today for follow up. Since he was last seen, the patient underwent a prostate MRI which demonstrated a highly suspicious right prostatic lesion, PI-RADS 5.  His current PSA is 14.8. Patient denies any hematuria or lower urinary tract symptoms. He denies any pain.The patient is entirely asymptomatic. All other review of systems are negative. His social and family history remain unchanged.  He has no previous surgical history. Past medical history, family history and social history were inquired and were noncontributory to current condition. Medications and allergies were reviewed. He has no known allergies to medication. \par \par On examination, the patient is an older-appearing gentleman in no acute distress. He is alert and oriented follows commands. He has normal mood and affect. He is normocephalic. Neck is supple. Oral no thrush. Respirations are unlabored. His abdomen is soft and nontender. Bladder is nonpalpable. No CVA tenderness. Neurologically he is grossly intact. No peripheral edema. Skin without gross abnormality. He has normal male external genitalia. Normal meatus. Bilateral testes are descended intrascrotally and normal to palpation. On rectal examination, there is normal sphincter tone. The prostate is clinically benign without focal induration or nodularity.\par \par His BMP demonstrated normal renal functions, creatinine 1.17. His PSA was 14.8, which is elevated. His urinalysis was unremarkable. \par \par His prostate MRI demonstrated a prostate gland measuring 54 cc with a highly suspicious right prostatic lesion, PI-RADS 5. \par \par Assessment: Elevated PSA.\par \par I counseled the patient. His prostate MRI demonstrated a highly suspicious right prostatic lesion, PI-RADS 5.  I discussed with him the risk of occult malignancy is high. I recommended the patient proceed with fusion prostate biopsy. I counseled the patient regarding the procedure. The risks and benefits were discussed. Alternatives were given. I answered the patient questions. The patient will take the necessary preparations for the procedure, including fleet edema. The patient will follow-up as directed and will contact me with any questions or concerns. Thank you for the opportunity to participate in the care of Mr. BRAMBILA. I will keep you updated on his progress. \par \par Plan: Fusion prostate biopsy. Follow up as directed.

## 2022-02-10 ENCOUNTER — APPOINTMENT (OUTPATIENT)
Dept: UROLOGY | Facility: CLINIC | Age: 77
End: 2022-02-10
Payer: MEDICARE

## 2022-02-10 ENCOUNTER — OUTPATIENT (OUTPATIENT)
Dept: OUTPATIENT SERVICES | Facility: HOSPITAL | Age: 77
LOS: 1 days | End: 2022-02-10
Payer: MEDICARE

## 2022-02-10 VITALS — HEART RATE: 67 BPM | RESPIRATION RATE: 16 BRPM | SYSTOLIC BLOOD PRESSURE: 155 MMHG | DIASTOLIC BLOOD PRESSURE: 85 MMHG

## 2022-02-10 VITALS
SYSTOLIC BLOOD PRESSURE: 158 MMHG | HEART RATE: 58 BPM | TEMPERATURE: 98 F | RESPIRATION RATE: 14 BRPM | OXYGEN SATURATION: 100 % | DIASTOLIC BLOOD PRESSURE: 78 MMHG

## 2022-02-10 DIAGNOSIS — R35.0 FREQUENCY OF MICTURITION: ICD-10-CM

## 2022-02-10 PROCEDURE — 55700: CPT

## 2022-02-10 PROCEDURE — 76872 US TRANSRECTAL: CPT | Mod: 26

## 2022-02-10 PROCEDURE — 76377 3D RENDER W/INTRP POSTPROCES: CPT | Mod: 26

## 2022-02-10 PROCEDURE — 76942 ECHO GUIDE FOR BIOPSY: CPT | Mod: 26,59

## 2022-02-10 PROCEDURE — 76872 US TRANSRECTAL: CPT

## 2022-02-10 RX ORDER — ENEMA 19; 7 G/133ML; G/133ML
7-19 ENEMA RECTAL
Qty: 1 | Refills: 0 | Status: COMPLETED | COMMUNITY
Start: 2022-01-27 | End: 2022-02-10

## 2022-02-10 RX ORDER — ENEMA 19; 7 G/133ML; G/133ML
7-19 ENEMA RECTAL
Qty: 1 | Refills: 0 | Status: COMPLETED | COMMUNITY
Start: 2022-01-20 | End: 2022-02-10

## 2022-02-10 RX ORDER — ENEMA 19; 7 G/133ML; G/133ML
7-19 ENEMA RECTAL
Qty: 1 | Refills: 0 | Status: COMPLETED | COMMUNITY
Start: 2022-01-24 | End: 2022-02-10

## 2022-02-11 ENCOUNTER — NON-APPOINTMENT (OUTPATIENT)
Age: 77
End: 2022-02-11

## 2022-02-14 DIAGNOSIS — R97.20 ELEVATED PROSTATE SPECIFIC ANTIGEN [PSA]: ICD-10-CM

## 2022-02-15 LAB — CORE LAB BIOPSY: NORMAL

## 2022-03-03 ENCOUNTER — APPOINTMENT (OUTPATIENT)
Dept: UROLOGY | Facility: CLINIC | Age: 77
End: 2022-03-03
Payer: MEDICARE

## 2022-03-03 PROCEDURE — 99214 OFFICE O/P EST MOD 30 MIN: CPT

## 2022-03-03 NOTE — LETTER BODY
[FreeTextEntry1] : Emily Tuttle MD\par [address] [facility], [address], \par [place], [place]\par [phone]\par \par Dear Dr. Tuttle, \par \par Reason for Visit: Prostate cancer. 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 Patient returns today to discuss the results of the biopsy. Patient denies any side effects of difficulty of the prostate biopsy.  He denies any fevers or chills. He denies any pain.Patient denies any changes in health. The past medical history and family history and social history are unchanged. All other review of systems are negative. Patient denies any changes in medications. Medication list was reconciled.\par \par On examination, the patient is a healthy-appearing gentleman in no acute distress. He is alert and oriented follows commands. He has normal mood and affect. He is normocephalic. Neck is supple. Oral no thrush Respirations are unlabored. His abdomen is soft and nontender. Bladder is nonpalpable. No CVA tenderness. Neurologically he is grossly intact. No peripheral edema. Skin without gross abnormality. He has normal male external genitalia. Normal meatus. Bilateral testes are descended intrascrotally and normal to palpation. On rectal examination, there is normal sphincter tone. The prostate is clinically benign without focal induration or nodularity.\par \par Prostate biopsy demonstrated evidence of Skillman 7 adenocarcinoma of the prostate involving 2 of 15 cores.\par \par Assessment: Prostate cancer\par \par I counseled the patient on its clinical significance. I discussed the risk of metastatic disease and the probability of organ confined disease. I discussed the treatment options available to him including expectant management and radiation. The risks and benefits of each options were discussed in detail as well quality of life issues. I answered his questions. He wishes to consider his options. Risks and benefits were discussed. Alternatives were given. I answered the patient questions. The patient wishes to consider the procedure and discuss with his family. The patient will follow-up as directed and will contact me with any questions or concerns or he has made a decision regarding the treatment of his prostate cancer.\par \par Plan: Consider radiation. Follow up as directed.

## 2022-03-14 ENCOUNTER — OUTPATIENT (OUTPATIENT)
Dept: OUTPATIENT SERVICES | Facility: HOSPITAL | Age: 77
LOS: 1 days | Discharge: HOME | End: 2022-03-14

## 2022-03-18 ENCOUNTER — APPOINTMENT (OUTPATIENT)
Dept: RADIATION ONCOLOGY | Facility: CLINIC | Age: 77
End: 2022-03-18
Payer: MEDICARE

## 2022-03-18 VITALS
SYSTOLIC BLOOD PRESSURE: 134 MMHG | BODY MASS INDEX: 22.53 KG/M2 | HEART RATE: 64 BPM | DIASTOLIC BLOOD PRESSURE: 78 MMHG | OXYGEN SATURATION: 100 % | RESPIRATION RATE: 17 BRPM | WEIGHT: 132 LBS | TEMPERATURE: 97 F | HEIGHT: 64 IN

## 2022-03-18 DIAGNOSIS — H54.8 LEGAL BLINDNESS, AS DEFINED IN USA: ICD-10-CM

## 2022-03-18 PROCEDURE — 99204 OFFICE O/P NEW MOD 45 MIN: CPT | Mod: 25

## 2022-03-18 NOTE — VITALS
[Maximal Pain Intensity: 0/10] : 0/10 [Least Pain Intensity: 0/10] : 0/10 [Date: ____________] : Patient's last distress assessment performed on [unfilled]. [4 - Distress Level] : Distress Level: 4 [Patient given social work contact information and resource sheet] : Patient was given social work contact information and resource sheet [ECOG Performance Status: 2 - Ambulatory and capable of all self care but unable to carry out any work activities] : Performance Status: 2 - Ambulatory and capable of all self care but unable to carry out any work activities. Up and about more than 50% of waking hours

## 2022-03-22 NOTE — REVIEW OF SYSTEMS
[Patient Intake Form Reviewed] : Patient intake form was reviewed [IPSS Score (0-40): ___] : IPSS score: [unfilled] [EPIC-CP Score (0-60): ___] : EPIC-CP score: [unfilled] [Negative] : Heme/Lymph [FreeTextEntry3] : legally blind

## 2022-03-22 NOTE — HISTORY OF PRESENT ILLNESS
[FreeTextEntry1] : Mr. Lynn is a 76 year old male being seen in consultation for consideration of radiation therapy.  He is accompanied by his wife today.  \par \par Diagnosis:  Favorable Intermediate Risk Adenocarcinoma of the Prostate, Dell[place] 3+4=7 in 2/16 cores, PSA 15.60 ng/mL, MRI T2 - tumor abuts but does not deform capsule. \par \par HPI:\par 12/15/21 - found to have elevated PSA 15.60 ng/mL on routine blood work with PCP Dr. MEME Tuttle \par 1/20/22 -  seen in consultation with Dr. KATHARINA Flores (urologist), repeat PSA 14.80 ng/mL\par \par 1/23/22 - MRI of Prostate:  T2-  Right, anterior (TZa), base-to-midgland, transition zone lesion.  Tumor abuts but does not deform capsule.  No HILTON, no seminal vesicle invasion, no pelvic adenopathy, and no suspicious bone lesions; PIRADS  5\par \par 2/10/22 -  underwent US guided transperineal fusion biopsy of the Prostate.  Pathology:  Adenocarcinoma, 2/16 cores with Dellroy score 3+4=7, Grade Group 2\par \par 3/18/22 -  being seen in consultation.  Mr. 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  He is overall feeling well, denies any pain or urinary symptoms.  He is fully active with assistance in navigating by his wife.  Denies any bowel issues.  \par IPSS 2 / EPIC 0

## 2022-03-22 NOTE — DISEASE MANAGEMENT
[10-20] : 10 - 20 ng/mL [Biopsy with Fusion] : Patient had a biopsy with fusion on [7(3+4)] : Fusion Biopsy [place] Score: 7(3+4) [] : Patient had a Prostate MRI [5] : 5 [IIB] : IIB [BiopsyDate] : 02/2022 [MeasuredProstateVolume] : 54 [TotalCores] : 16 [TotalPositiveCores] : 2 [MaxCoreInvolvement] : 60 [FreeTextEntry7] : M

## 2022-03-23 LAB — PSA SERPL-MCNC: 13.2 NG/ML

## 2022-04-01 ENCOUNTER — RX RENEWAL (OUTPATIENT)
Age: 77
End: 2022-04-01

## 2022-04-06 ENCOUNTER — TRANSCRIPTION ENCOUNTER (OUTPATIENT)
Age: 77
End: 2022-04-06

## 2022-04-06 RX ORDER — BLOOD SUGAR DIAGNOSTIC
STRIP MISCELLANEOUS
Qty: 1 | Refills: 2 | Status: ACTIVE | COMMUNITY
Start: 1900-01-01 | End: 1900-01-01

## 2022-05-23 ENCOUNTER — TRANSCRIPTION ENCOUNTER (OUTPATIENT)
Age: 77
End: 2022-05-23

## 2022-06-01 LAB
ALBUMIN SERPL ELPH-MCNC: 4.9 G/DL
ALP BLD-CCNC: 81 U/L
ALT SERPL-CCNC: 26 U/L
ANION GAP SERPL CALC-SCNC: 15 MMOL/L
AST SERPL-CCNC: 26 U/L
BILIRUB SERPL-MCNC: 0.4 MG/DL
BUN SERPL-MCNC: 27 MG/DL
CALCIUM SERPL-MCNC: 10 MG/DL
CHLORIDE SERPL-SCNC: 105 MMOL/L
CHOLEST SERPL-MCNC: 114 MG/DL
CK SERPL-CCNC: 69 U/L
CO2 SERPL-SCNC: 23 MMOL/L
CREAT SERPL-MCNC: 1.2 MG/DL
EGFR: 63 ML/MIN/1.73M2
ESTIMATED AVERAGE GLUCOSE: 126 MG/DL
GLUCOSE SERPL-MCNC: 124 MG/DL
HBA1C MFR BLD HPLC: 6 %
HDLC SERPL-MCNC: 61 MG/DL
LDLC SERPL CALC-MCNC: 37 MG/DL
NONHDLC SERPL-MCNC: 52 MG/DL
POTASSIUM SERPL-SCNC: 4.5 MMOL/L
PROT SERPL-MCNC: 7.4 G/DL
PSA SERPL-MCNC: 15.5 NG/ML
SODIUM SERPL-SCNC: 142 MMOL/L
TRIGL SERPL-MCNC: 74 MG/DL

## 2022-06-02 LAB — PSA SERPL-MCNC: 15.6 NG/ML

## 2022-06-08 ENCOUNTER — APPOINTMENT (OUTPATIENT)
Dept: RADIATION ONCOLOGY | Facility: CLINIC | Age: 77
End: 2022-06-08
Payer: MEDICARE

## 2022-06-08 VITALS
TEMPERATURE: 98 F | DIASTOLIC BLOOD PRESSURE: 63 MMHG | HEART RATE: 65 BPM | HEIGHT: 64 IN | BODY MASS INDEX: 20.83 KG/M2 | WEIGHT: 122 LBS | RESPIRATION RATE: 16 BRPM | SYSTOLIC BLOOD PRESSURE: 105 MMHG | OXYGEN SATURATION: 100 %

## 2022-06-08 PROCEDURE — 99212 OFFICE O/P EST SF 10 MIN: CPT

## 2022-06-08 NOTE — HISTORY OF PRESENT ILLNESS
[FreeTextEntry1] : Mr. Lynn is a 77 year old male being seen in follow up visit.  He is accompanied by his wife today.  \par \par Diagnosis:  2/2022 -  Favorable Intermediate Risk Adenocarcinoma of the Prostate, [place] 3+4=7 in 2/16 cores, PSA 15.60 ng/mL, MRI T2 - tumor abuts but does not deform capsule. \par \par *ON ACTIVE SURVEILLANCE* \par \par HPI:\par 12/15/21 - found to have elevated PSA 15.60 ng/mL on routine blood work with PCP Dr. MEME Tuttle \par 1/20/22 -  seen in consultation with Dr. KATHARINA Flores (urologist), repeat PSA 14.80 ng/mL\par \par 1/23/22 - MRI of Prostate:  T2-  Right, anterior (TZa), base-to-midgland, transition zone lesion.  Tumor abuts but does not deform capsule.  No HILTON, no seminal vesicle invasion, no pelvic adenopathy, and no suspicious bone lesions; PIRADS  5\par \par 2/10/22 -  underwent US guided transperineal fusion biopsy of the Prostate.  Pathology:  Adenocarcinoma, 2/16 cores with Anibal score 3+4=7, Grade Group 2, 60% max involvement. \par \par PSA trend:\par 12/15/21 - 15.60 ng/mL\par 1/20/22 -  14.80\par 3/18/22 - 13.20\par 6/1/22 -  15.50\par \par 6/8/22 -   initial consultation was 3/18/22 and Mr. Lynn had decided on active surveillance at that time, he returns for follow up visit today.   Mr. Lynn is feeling well, continues with minimal urinary symptoms.  Denies any urinary retention, frequency, hematuria, or other issues.  He only gets up once a night occasionally.  IPSS 2 / EPIC 3\par To schedule follow up with Dr. Flores (urology).

## 2022-06-08 NOTE — REVIEW OF SYSTEMS
[Negative] : Heme/Lymph [IPSS Score (0-40): ___] : IPSS score: [unfilled] [EPIC-CP Score (0-60): ___] : EPIC-CP score: [unfilled] [FreeTextEntry3] : legally blind

## 2022-06-10 ENCOUNTER — APPOINTMENT (OUTPATIENT)
Dept: INTERNAL MEDICINE | Facility: CLINIC | Age: 77
End: 2022-06-10
Payer: MEDICARE

## 2022-06-10 VITALS — SYSTOLIC BLOOD PRESSURE: 122 MMHG | DIASTOLIC BLOOD PRESSURE: 76 MMHG

## 2022-06-10 VITALS
HEART RATE: 66 BPM | WEIGHT: 116 LBS | BODY MASS INDEX: 19.81 KG/M2 | OXYGEN SATURATION: 98 % | SYSTOLIC BLOOD PRESSURE: 134 MMHG | TEMPERATURE: 98 F | HEIGHT: 64 IN | DIASTOLIC BLOOD PRESSURE: 71 MMHG

## 2022-06-10 DIAGNOSIS — Z83.3 FAMILY HISTORY OF DIABETES MELLITUS: ICD-10-CM

## 2022-06-10 PROCEDURE — 99214 OFFICE O/P EST MOD 30 MIN: CPT

## 2022-06-11 NOTE — PHYSICAL EXAM
[No Acute Distress] : no acute distress [Well Nourished] : well nourished [Well Developed] : well developed [Supple] : supple [No Respiratory Distress] : no respiratory distress  [Clear to Auscultation] : lungs were clear to auscultation bilaterally [Normal Rate] : normal rate  [Regular Rhythm] : with a regular rhythm [Soft] : abdomen soft [Normal S1, S2] : normal S1 and S2 [Non Tender] : non-tender [Normal Bowel Sounds] : normal bowel sounds [No CVA Tenderness] : no CVA  tenderness [No Spinal Tenderness] : no spinal tenderness [No Joint Swelling] : no joint swelling [Normal Gait] : normal gait [Speech Grossly Normal] : speech grossly normal [Normal Affect] : the affect was normal [Alert and Oriented x3] : oriented to person, place, and time [Normal Mood] : the mood was normal [de-identified] : Elderly male,

## 2022-06-11 NOTE — HISTORY OF PRESENT ILLNESS
[FreeTextEntry1] : Pt presented for 6 month f/u of T2DM, HTN and HLD.  He had labs done and would like to review results. [de-identified] : Pt was diagnosed with prostate cancer with last PE, he is seen by  and is on active surveillance.  He had RadOnc consult, but declined treatment at present.\par \par Pt was well and did not get sick throughout the COVID pandemic.  He already had 4 doses of COVID vaccine.\par \par He feels well today, and denied any new complaiint.\par \par

## 2022-08-24 ENCOUNTER — RX RENEWAL (OUTPATIENT)
Age: 77
End: 2022-08-24

## 2022-10-14 ENCOUNTER — RX RENEWAL (OUTPATIENT)
Age: 77
End: 2022-10-14

## 2022-10-17 ENCOUNTER — TRANSCRIPTION ENCOUNTER (OUTPATIENT)
Age: 77
End: 2022-10-17

## 2022-10-18 ENCOUNTER — TRANSCRIPTION ENCOUNTER (OUTPATIENT)
Age: 77
End: 2022-10-18

## 2022-10-21 ENCOUNTER — RX RENEWAL (OUTPATIENT)
Age: 77
End: 2022-10-21

## 2022-11-17 ENCOUNTER — RX RENEWAL (OUTPATIENT)
Age: 77
End: 2022-11-17

## 2022-11-22 ENCOUNTER — TRANSCRIPTION ENCOUNTER (OUTPATIENT)
Age: 77
End: 2022-11-22

## 2022-12-05 ENCOUNTER — LABORATORY RESULT (OUTPATIENT)
Age: 77
End: 2022-12-05

## 2022-12-05 LAB
ALBUMIN SERPL ELPH-MCNC: 4.9 G/DL
ALP BLD-CCNC: 79 U/L
ALT SERPL-CCNC: 23 U/L
ANION GAP SERPL CALC-SCNC: 16 MMOL/L
APPEARANCE: CLEAR
AST SERPL-CCNC: 24 U/L
BASOPHILS # BLD AUTO: 0.14 K/UL
BASOPHILS NFR BLD AUTO: 1.5 %
BILIRUB SERPL-MCNC: 0.4 MG/DL
BILIRUBIN URINE: NEGATIVE
BLOOD URINE: ABNORMAL
BUN SERPL-MCNC: 26 MG/DL
CALCIUM SERPL-MCNC: 10 MG/DL
CHLORIDE SERPL-SCNC: 105 MMOL/L
CHOLEST SERPL-MCNC: 135 MG/DL
CK SERPL-CCNC: 78 U/L
CO2 SERPL-SCNC: 22 MMOL/L
COLOR: YELLOW
CREAT SERPL-MCNC: 1.26 MG/DL
EGFR: 59 ML/MIN/1.73M2
EOSINOPHIL # BLD AUTO: 0.31 K/UL
EOSINOPHIL NFR BLD AUTO: 3.3 %
ESTIMATED AVERAGE GLUCOSE: 128 MG/DL
GLUCOSE QUALITATIVE U: NEGATIVE
GLUCOSE SERPL-MCNC: 169 MG/DL
HBA1C MFR BLD HPLC: 6.1 %
HCT VFR BLD CALC: 38.8 %
HDLC SERPL-MCNC: 75 MG/DL
HGB BLD-MCNC: 12.7 G/DL
IMM GRANULOCYTES NFR BLD AUTO: 0.4 %
KETONES URINE: NEGATIVE
LDLC SERPL CALC-MCNC: 45 MG/DL
LEUKOCYTE ESTERASE URINE: NEGATIVE
LYMPHOCYTES # BLD AUTO: 3.59 K/UL
LYMPHOCYTES NFR BLD AUTO: 38.6 %
MAN DIFF?: NORMAL
MCHC RBC-ENTMCNC: 27.3 PG
MCHC RBC-ENTMCNC: 32.7 GM/DL
MCV RBC AUTO: 83.4 FL
MONOCYTES # BLD AUTO: 0.85 K/UL
MONOCYTES NFR BLD AUTO: 9.1 %
NEUTROPHILS # BLD AUTO: 4.36 K/UL
NEUTROPHILS NFR BLD AUTO: 47.1 %
NITRITE URINE: NEGATIVE
NONHDLC SERPL-MCNC: 60 MG/DL
PH URINE: 6
PLATELET # BLD AUTO: 313 K/UL
POTASSIUM SERPL-SCNC: 4.5 MMOL/L
PROT SERPL-MCNC: 7.5 G/DL
PROTEIN URINE: ABNORMAL
PSA SERPL-MCNC: 16.7 NG/ML
RBC # BLD: 4.65 M/UL
RBC # FLD: 14.4 %
SODIUM SERPL-SCNC: 143 MMOL/L
SPECIFIC GRAVITY URINE: 1.02
TRIGL SERPL-MCNC: 71 MG/DL
UROBILINOGEN URINE: NORMAL
WBC # FLD AUTO: 9.29 K/UL

## 2022-12-06 LAB
FERRITIN SERPL-MCNC: 14 NG/ML
FOLATE SERPL-MCNC: >20 NG/ML
IRON SATN MFR SERPL: 9 %
IRON SERPL-MCNC: 46 UG/DL
T4 FREE SERPL-MCNC: 1.4 NG/DL
TIBC SERPL-MCNC: 496 UG/DL
TSH SERPL-ACNC: 4.78 UIU/ML
UIBC SERPL-MCNC: 450 UG/DL
VIT B12 SERPL-MCNC: 383 PG/ML

## 2022-12-08 ENCOUNTER — APPOINTMENT (OUTPATIENT)
Dept: UROLOGY | Facility: CLINIC | Age: 77
End: 2022-12-08

## 2022-12-08 VITALS
HEART RATE: 60 BPM | HEIGHT: 64 IN | DIASTOLIC BLOOD PRESSURE: 67 MMHG | RESPIRATION RATE: 17 BRPM | BODY MASS INDEX: 20.83 KG/M2 | WEIGHT: 122 LBS | SYSTOLIC BLOOD PRESSURE: 137 MMHG

## 2022-12-08 DIAGNOSIS — H34.12 CENTRAL RETINAL ARTERY OCCLUSION, LEFT EYE: ICD-10-CM

## 2022-12-08 PROCEDURE — 88112 CYTOPATH CELL ENHANCE TECH: CPT | Mod: 26

## 2022-12-08 PROCEDURE — 99214 OFFICE O/P EST MOD 30 MIN: CPT

## 2022-12-08 NOTE — LETTER BODY
[FreeTextEntry1] : Emily Tuttle MD\par [address] [facility], [address], \par [place], [place]\par [phone]\par \par Dear Dr. 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 Patient's previous prostate biopsy demonstrated Anibal 7 prostate cancer involving 2 of 15 cores. Patient returns today for follow up. Patient reports hematuria. He denies any fevers or chills. He denies any pain.Patient denies any changes in health. The past medical history and family history and social history are unchanged. All other review of systems are negative. Patient denies any changes in medications. Medication list was reconciled.\par \par On examination, the patient is a healthy-appearing gentleman in no acute distress. He is alert and oriented follows commands. He has normal mood and affect. He is normocephalic. Neck is supple. Oral no thrush Respirations are unlabored. His abdomen is soft and nontender. Bladder is nonpalpable. No CVA tenderness. Neurologically he is grossly intact. No peripheral edema. Skin without gross abnormality. He has normal male external genitalia. Normal meatus. Bilateral testes are descended intrascrotally and normal to palpation. On rectal examination, there is normal sphincter tone. The prostate is clinically benign without focal induration or nodularity.\par \par Assessment: Prostate cancer.\par \par I counseled the patient. Patient declines radiation therapy and wishes to continue with active surveillance. Patient reports gross hematuria. I recommended patient obtain a cystoscopy, CT scan, urinalysis and urine cytology. I counseled the patient regarding the procedure. The risks and benefits were discussed. Alternatives were given. I answered the patient questions. The patient will take the necessary preparations for the procedure. I answered his questions. Risks and benefits were discussed. Alternatives were given. I answered the patient questions. The patient wishes to consider the procedure and discuss with his family. The patient will follow-up as directed and will contact me with any questions or concerns or he has made a decision regarding the treatment of his prostate cancer.\par \par Plan: Cystoscopy. CT scan. Urinalysis. Urine cytology. Follow up in 6 months for active surveillance of prostate cancer.

## 2022-12-08 NOTE — ADDENDUM
[FreeTextEntry1] : Entered by Ej Pedroza, acting as scribe for Dr. Omar Flores.\par The documentation recorded by the scribe accurately reflects the service I personally performed and the decisions made by me.

## 2022-12-11 LAB
APPEARANCE: CLEAR
BACTERIA: NEGATIVE
BILIRUBIN URINE: NEGATIVE
BLOOD URINE: ABNORMAL
COLOR: YELLOW
GLUCOSE QUALITATIVE U: ABNORMAL
HYALINE CASTS: 0 /LPF
KETONES URINE: NEGATIVE
LEUKOCYTE ESTERASE URINE: NEGATIVE
MICROSCOPIC-UA: NORMAL
NITRITE URINE: NEGATIVE
PH URINE: 6
PROTEIN URINE: ABNORMAL
RED BLOOD CELLS URINE: 108 /HPF
SPECIFIC GRAVITY URINE: 1.02
SQUAMOUS EPITHELIAL CELLS: 2 /HPF
UROBILINOGEN URINE: NORMAL
WHITE BLOOD CELLS URINE: 2 /HPF

## 2022-12-14 ENCOUNTER — APPOINTMENT (OUTPATIENT)
Dept: INTERNAL MEDICINE | Facility: CLINIC | Age: 77
End: 2022-12-14

## 2022-12-14 ENCOUNTER — NON-APPOINTMENT (OUTPATIENT)
Age: 77
End: 2022-12-14

## 2022-12-14 VITALS
HEART RATE: 50 BPM | HEIGHT: 64 IN | TEMPERATURE: 97.7 F | SYSTOLIC BLOOD PRESSURE: 128 MMHG | WEIGHT: 122 LBS | OXYGEN SATURATION: 97 % | DIASTOLIC BLOOD PRESSURE: 74 MMHG | BODY MASS INDEX: 20.83 KG/M2

## 2022-12-14 VITALS — SYSTOLIC BLOOD PRESSURE: 126 MMHG | DIASTOLIC BLOOD PRESSURE: 80 MMHG

## 2022-12-14 DIAGNOSIS — U07.1 COVID-19: ICD-10-CM

## 2022-12-14 DIAGNOSIS — Z83.6 FAMILY HISTORY OF OTHER DISEASES OF THE RESPIRATORY SYSTEM: ICD-10-CM

## 2022-12-14 DIAGNOSIS — Z92.29 PERSONAL HISTORY OF OTHER DRUG THERAPY: ICD-10-CM

## 2022-12-14 DIAGNOSIS — Z78.9 OTHER SPECIFIED HEALTH STATUS: ICD-10-CM

## 2022-12-14 DIAGNOSIS — Z82.3 FAMILY HISTORY OF STROKE: ICD-10-CM

## 2022-12-14 DIAGNOSIS — Z23 ENCOUNTER FOR IMMUNIZATION: ICD-10-CM

## 2022-12-14 DIAGNOSIS — Z12.11 ENCOUNTER FOR SCREENING FOR MALIGNANT NEOPLASM OF COLON: ICD-10-CM

## 2022-12-14 PROCEDURE — G0439: CPT

## 2022-12-14 PROCEDURE — G0008: CPT

## 2022-12-14 PROCEDURE — 93000 ELECTROCARDIOGRAM COMPLETE: CPT | Mod: 59

## 2022-12-14 PROCEDURE — 90662 IIV NO PRSV INCREASED AG IM: CPT

## 2022-12-14 PROCEDURE — G0444 DEPRESSION SCREEN ANNUAL: CPT | Mod: 59

## 2022-12-15 PROBLEM — Z12.11 COLON CANCER SCREENING: Status: ACTIVE | Noted: 2021-12-10

## 2022-12-15 PROBLEM — Z92.29 COVID-19 VACCINE SERIES COMPLETED: Status: RESOLVED | Noted: 2022-06-08 | Resolved: 2022-12-15

## 2022-12-15 PROBLEM — U07.1 COVID-19 VIRUS INFECTION: Status: RESOLVED | Noted: 2022-12-15 | Resolved: 2022-12-15

## 2022-12-15 PROBLEM — Z23 ENCOUNTER FOR IMMUNIZATION: Status: ACTIVE | Noted: 2020-10-06

## 2022-12-15 PROBLEM — Z78.9 NO PERTINENT PAST MEDICAL HISTORY: Status: RESOLVED | Noted: 2021-12-10 | Resolved: 2022-12-15

## 2022-12-15 LAB — URINE CYTOLOGY: NORMAL

## 2022-12-15 RX ORDER — METFORMIN HYDROCHLORIDE 500 MG/1
500 TABLET, FILM COATED, EXTENDED RELEASE ORAL
Refills: 0 | Status: COMPLETED | COMMUNITY
Start: 2021-02-24 | End: 2022-12-15

## 2022-12-15 NOTE — HEALTH RISK ASSESSMENT
[Very Good] : ~his/her~  mood as very good [Yes] : Yes [1 or 2 (0 pts)] : 1 or 2 (0 points) [Never (0 pts)] : Never (0 points) [No] : In the past 12 months have you used drugs other than those required for medical reasons? No [No falls in past year] : Patient reported no falls in the past year [0] : 2) Feeling down, depressed, or hopeless: Not at all (0) [PHQ-2 Negative - No further assessment needed] : PHQ-2 Negative - No further assessment needed [Patient declined colonoscopy] : Patient declined colonoscopy [None] : None [With Family] : lives with family [# of Members in Household ___] :  household currently consist of [unfilled] member(s) [Retired] : retired [Graduate School] : graduate school [] :  [# Of Children ___] : has [unfilled] children [Feels Safe at Home] : Feels safe at home [Fully functional (bathing, dressing, toileting, transferring, walking, feeding)] : Fully functional (bathing, dressing, toileting, transferring, walking, feeding) [Fully functional (using the telephone, shopping, preparing meals, housekeeping, doing laundry, using] : Fully functional and needs no help or supervision to perform IADLs (using the telephone, shopping, preparing meals, housekeeping, doing laundry, using transportation, managing medications and managing finances) [Reports changes in vision] : Reports changes in vision [Smoke Detector] : smoke detector [Carbon Monoxide Detector] : carbon monoxide detector [Seat Belt] :  uses seat belt [With Patient/Caregiver] : , with patient/caregiver [Never] : Never [2 - 3 times a week (3 pts)] : 2 - 3  times a week (3 points) [Audit-CScore] : 3 [de-identified] : exercises on the treadmill for 40 minutes 7 days per week [[id number]] : 0 [EyeExamDate] : 10/21 [Change in mental status noted] : No change in mental status noted [Reports changes in hearing] : Reports no changes in hearing [Reports changes in dental health] : Reports no changes in dental health [ColonoscopyDate] : Never [de-identified] : No driving or housework due to poor vision, 9/2020 [de-identified] : visual impair in L eye, legally blind [de-identified] : dentist - 5 years ago [AdvancecareDate] : 12/22

## 2022-12-15 NOTE — PHYSICAL EXAM
[No Acute Distress] : no acute distress [Well Nourished] : well nourished [Well Developed] : well developed [Normal Sclera/Conjunctiva] : normal sclera/conjunctiva [PERRL] : pupils equal round and reactive to light [EOMI] : extraocular movements intact [Normal Outer Ear/Nose] : the outer ears and nose were normal in appearance [Normal Oropharynx] : the oropharynx was normal [Normal TMs] : both tympanic membranes were normal [Normal Nasal Mucosa] : the nasal mucosa was normal [No JVD] : no jugular venous distention [No Lymphadenopathy] : no lymphadenopathy [Supple] : supple [No Respiratory Distress] : no respiratory distress  [Clear to Auscultation] : lungs were clear to auscultation bilaterally [Normal Rate] : normal rate  [Regular Rhythm] : with a regular rhythm [Normal S1, S2] : normal S1 and S2 [No Carotid Bruits] : no carotid bruits [Pedal Pulses Present] : the pedal pulses are present [No Edema] : there was no peripheral edema [No Extremity Clubbing/Cyanosis] : no extremity clubbing/cyanosis [Normal Appearance] : normal in appearance [No Masses] : no palpable masses [No Nipple Discharge] : no nipple discharge [No Axillary Lymphadenopathy] : no axillary lymphadenopathy [Soft] : abdomen soft [Non Tender] : non-tender [Non-distended] : non-distended [Normal Bowel Sounds] : normal bowel sounds [Declined Rectal Exam] : declined rectal exam [Normal Supraclavicular Nodes] : no supraclavicular lymphadenopathy [Normal Axillary Nodes] : no axillary lymphadenopathy [Normal Posterior Cervical Nodes] : no posterior cervical lymphadenopathy [Normal Anterior Cervical Nodes] : no anterior cervical lymphadenopathy [No CVA Tenderness] : no CVA  tenderness [No Spinal Tenderness] : no spinal tenderness [No Joint Swelling] : no joint swelling [Grossly Normal Strength/Tone] : grossly normal strength/tone [No Rash] : no rash [Coordination Grossly Intact] : coordination grossly intact [No Focal Deficits] : no focal deficits [Normal Gait] : normal gait [Speech Grossly Normal] : speech grossly normal [Normal Affect] : the affect was normal [Alert and Oriented x3] : oriented to person, place, and time [Normal Mood] : the mood was normal [de-identified] : male in stated age,  [de-identified] : Very poor vision, [FreeTextEntry1] : deferred, pt is examined by  1-2 x per year,

## 2022-12-15 NOTE — HISTORY OF PRESENT ILLNESS
[Spouse] : spouse [FreeTextEntry1] : Pt presented for PE.  Last PE was 1 year ago. [de-identified] : Pt was diagnosed with prostate cancer since last visit, and is on active surveillance.\par \par He had COVID infection in 10/2022, it was mild.  He had a positive home test.  He recovered completely.   He had 3 doses of COVID vaccine.  He would like to get flu vaccine today.

## 2022-12-15 NOTE — REVIEW OF SYSTEMS
[Constipation] : constipation [Negative] : Heme/Lymph [Fever] : no fever [Chills] : no chills [Fatigue] : no fatigue [Recent Change In Weight] : ~T no recent weight change [Chest Pain] : no chest pain [Palpitations] : no palpitations [Lower Ext Edema] : no lower extremity edema [Shortness Of Breath] : no shortness of breath [Wheezing] : no wheezing [Cough] : no cough [Dyspnea on Exertion] : not dyspnea on exertion [Abdominal Pain] : no abdominal pain [Nausea] : no nausea [Diarrhea] : no diarrhea [Vomiting] : no vomiting [Heartburn] : no heartburn [Melena] : no melena [Dysuria] : no dysuria [Incontinence] : no incontinence [Nocturia] : no nocturia [Hematuria] : no hematuria [Joint Pain] : no joint pain [Joint Stiffness] : no joint stiffness [Muscle Pain] : no muscle pain [Back Pain] : no back pain [Itching] : no itching [Mole Changes] : no mole changes [Skin Rash] : no skin rash [Headache] : no headache [Dizziness] : no dizziness [Fainting] : no fainting [Unsteady Walk] : no ataxia [Insomnia] : no insomnia [Anxiety] : no anxiety [Depression] : no depression [Easy Bleeding] : no easy bleeding [Easy Bruising] : no easy bruising [Swollen Glands] : no swollen glands [FreeTextEntry3] : Pt is legally blind, [FreeTextEntry7] : Pt has 1 BM every 2 days,

## 2022-12-21 ENCOUNTER — RX RENEWAL (OUTPATIENT)
Age: 77
End: 2022-12-21

## 2023-01-05 ENCOUNTER — APPOINTMENT (OUTPATIENT)
Dept: CT IMAGING | Facility: IMAGING CENTER | Age: 78
End: 2023-01-05
Payer: MEDICARE

## 2023-01-05 ENCOUNTER — OUTPATIENT (OUTPATIENT)
Dept: OUTPATIENT SERVICES | Facility: HOSPITAL | Age: 78
LOS: 1 days | End: 2023-01-05
Payer: MEDICARE

## 2023-01-05 DIAGNOSIS — C61 MALIGNANT NEOPLASM OF PROSTATE: ICD-10-CM

## 2023-01-05 DIAGNOSIS — G25.0 ESSENTIAL TREMOR: ICD-10-CM

## 2023-01-05 DIAGNOSIS — E11.9 TYPE 2 DIABETES MELLITUS WITHOUT COMPLICATIONS: ICD-10-CM

## 2023-01-05 DIAGNOSIS — H34.12 CENTRAL RETINAL ARTERY OCCLUSION, LEFT EYE: ICD-10-CM

## 2023-01-05 PROCEDURE — 74178 CT ABD&PLV WO CNTR FLWD CNTR: CPT

## 2023-01-05 PROCEDURE — 74178 CT ABD&PLV WO CNTR FLWD CNTR: CPT | Mod: 26

## 2023-01-12 ENCOUNTER — APPOINTMENT (OUTPATIENT)
Dept: UROLOGY | Facility: CLINIC | Age: 78
End: 2023-01-12
Payer: MEDICARE

## 2023-01-12 ENCOUNTER — OUTPATIENT (OUTPATIENT)
Dept: OUTPATIENT SERVICES | Facility: HOSPITAL | Age: 78
LOS: 1 days | End: 2023-01-12
Payer: MEDICARE

## 2023-01-12 VITALS
WEIGHT: 122 LBS | DIASTOLIC BLOOD PRESSURE: 75 MMHG | HEIGHT: 64 IN | TEMPERATURE: 97 F | HEART RATE: 59 BPM | BODY MASS INDEX: 20.83 KG/M2 | SYSTOLIC BLOOD PRESSURE: 144 MMHG

## 2023-01-12 DIAGNOSIS — R35.0 FREQUENCY OF MICTURITION: ICD-10-CM

## 2023-01-12 PROCEDURE — 52000 CYSTOURETHROSCOPY: CPT

## 2023-01-12 PROCEDURE — 88112 CYTOPATH CELL ENHANCE TECH: CPT | Mod: 26

## 2023-01-17 LAB — URINE CYTOLOGY: NORMAL

## 2023-01-19 ENCOUNTER — TRANSCRIPTION ENCOUNTER (OUTPATIENT)
Age: 78
End: 2023-01-19

## 2023-01-19 DIAGNOSIS — Z00.00 ENCOUNTER FOR GENERAL ADULT MEDICAL EXAMINATION WITHOUT ABNORMAL FINDINGS: ICD-10-CM

## 2023-01-19 DIAGNOSIS — R31.21 ASYMPTOMATIC MICROSCOPIC HEMATURIA: ICD-10-CM

## 2023-02-09 ENCOUNTER — APPOINTMENT (OUTPATIENT)
Dept: UROLOGY | Facility: CLINIC | Age: 78
End: 2023-02-09
Payer: MEDICARE

## 2023-02-09 PROCEDURE — 99214 OFFICE O/P EST MOD 30 MIN: CPT

## 2023-02-11 NOTE — LETTER BODY
[FreeTextEntry1] : Emily Tuttle MD\par [address] [facility], [address], \par [place], [place]\par [phone]\par \par Dear Dr. 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 Patient's previous prostate biopsy demonstrated Anibal 7 prostate cancer involving 2 of 15 cores. Patient returns today for follow up. Patient had previous hematuria. He denies any fevers or chills. He denies any pain. Patient denies any changes in health. The past medical history and family history and social history are unchanged. All other review of systems are negative. Patient denies any changes in medications. Medication list was reconciled.\par \par On examination, the patient is a healthy-appearing gentleman in no acute distress. He is alert and oriented follows commands. He has normal mood and affect. He is normocephalic. Neck is supple. Oral no thrush Respirations are unlabored. His abdomen is soft and nontender. Bladder is nonpalpable. No CVA tenderness. Neurologically he is grossly intact. No peripheral edema. Skin without gross abnormality. He has normal male external genitalia. Normal meatus. Bilateral testes are descended intrascrotally and normal to palpation. On rectal examination, there is normal sphincter tone. The prostate is clinically benign without focal induration or nodularity.\par \par His urine cytology was negative. \par \par His PSA is 16.7, which is elevated. \par \par His CT scan demonstrated a 1.4 cm left kidney stone. \par \par Assessment: Prostate cancer. Kidney stone. Hematuria. \par \par I counseled the patient. In terms of his prostate cancer, his PSA is 16.7. Patient declines radiation therapy and wishes to continue with active surveillance and watchful waiting. In terms of his previous hematuria, his urine cytology was negative. In terms of his kidney stone, he declines intervention at this time. I discussed the risks with him. Risks and benefits were discussed. Alternatives were given. I answered the patient questions. The patient wishes to consider the procedure and discuss with his family. The patient will follow-up as directed and will contact me with any questions or concerns or he has made a decision regarding the treatment of his prostate cancer.\par \par Plan: Active surveillance of prostate cancer.  Follow up in 6 months. Consider left ureteroscopy, laser lithotripsy, stone extraction, and stent placement.

## 2023-03-20 ENCOUNTER — RX RENEWAL (OUTPATIENT)
Age: 78
End: 2023-03-20

## 2023-04-26 ENCOUNTER — OUTPATIENT (OUTPATIENT)
Dept: OUTPATIENT SERVICES | Facility: HOSPITAL | Age: 78
LOS: 1 days | End: 2023-04-26
Payer: MEDICARE

## 2023-04-26 VITALS
TEMPERATURE: 97 F | HEIGHT: 64 IN | OXYGEN SATURATION: 99 % | RESPIRATION RATE: 14 BRPM | SYSTOLIC BLOOD PRESSURE: 148 MMHG | WEIGHT: 115.96 LBS | DIASTOLIC BLOOD PRESSURE: 82 MMHG | HEART RATE: 65 BPM

## 2023-04-26 DIAGNOSIS — C61 MALIGNANT NEOPLASM OF PROSTATE: ICD-10-CM

## 2023-04-26 DIAGNOSIS — R31.0 GROSS HEMATURIA: ICD-10-CM

## 2023-04-26 DIAGNOSIS — Z01.818 ENCOUNTER FOR OTHER PREPROCEDURAL EXAMINATION: ICD-10-CM

## 2023-04-26 LAB
ANION GAP SERPL CALC-SCNC: 16 MMOL/L — SIGNIFICANT CHANGE UP (ref 5–17)
BUN SERPL-MCNC: 24 MG/DL — HIGH (ref 7–23)
CALCIUM SERPL-MCNC: 10.1 MG/DL — SIGNIFICANT CHANGE UP (ref 8.4–10.5)
CHLORIDE SERPL-SCNC: 100 MMOL/L — SIGNIFICANT CHANGE UP (ref 96–108)
CO2 SERPL-SCNC: 23 MMOL/L — SIGNIFICANT CHANGE UP (ref 22–31)
CREAT SERPL-MCNC: 0.93 MG/DL — SIGNIFICANT CHANGE UP (ref 0.5–1.3)
EGFR: 86 ML/MIN/1.73M2 — SIGNIFICANT CHANGE UP
GLUCOSE SERPL-MCNC: 135 MG/DL — HIGH (ref 70–99)
HCT VFR BLD CALC: 39.1 % — SIGNIFICANT CHANGE UP (ref 39–50)
HGB BLD-MCNC: 12.6 G/DL — LOW (ref 13–17)
MCHC RBC-ENTMCNC: 27.4 PG — SIGNIFICANT CHANGE UP (ref 27–34)
MCHC RBC-ENTMCNC: 32.2 GM/DL — SIGNIFICANT CHANGE UP (ref 32–36)
MCV RBC AUTO: 85 FL — SIGNIFICANT CHANGE UP (ref 80–100)
NRBC # BLD: 0 /100 WBCS — SIGNIFICANT CHANGE UP (ref 0–0)
PLATELET # BLD AUTO: 291 K/UL — SIGNIFICANT CHANGE UP (ref 150–400)
POTASSIUM SERPL-MCNC: 4 MMOL/L — SIGNIFICANT CHANGE UP (ref 3.5–5.3)
POTASSIUM SERPL-SCNC: 4 MMOL/L — SIGNIFICANT CHANGE UP (ref 3.5–5.3)
RBC # BLD: 4.6 M/UL — SIGNIFICANT CHANGE UP (ref 4.2–5.8)
RBC # FLD: 14.6 % — HIGH (ref 10.3–14.5)
SODIUM SERPL-SCNC: 139 MMOL/L — SIGNIFICANT CHANGE UP (ref 135–145)
WBC # BLD: 7.89 K/UL — SIGNIFICANT CHANGE UP (ref 3.8–10.5)
WBC # FLD AUTO: 7.89 K/UL — SIGNIFICANT CHANGE UP (ref 3.8–10.5)

## 2023-04-26 PROCEDURE — 36415 COLL VENOUS BLD VENIPUNCTURE: CPT

## 2023-04-26 PROCEDURE — 83036 HEMOGLOBIN GLYCOSYLATED A1C: CPT

## 2023-04-26 PROCEDURE — 80048 BASIC METABOLIC PNL TOTAL CA: CPT

## 2023-04-26 PROCEDURE — 85027 COMPLETE CBC AUTOMATED: CPT

## 2023-04-26 PROCEDURE — G0463: CPT

## 2023-04-26 PROCEDURE — 87086 URINE CULTURE/COLONY COUNT: CPT

## 2023-04-26 RX ORDER — SODIUM CHLORIDE 9 MG/ML
1000 INJECTION, SOLUTION INTRAVENOUS
Refills: 0 | Status: DISCONTINUED | OUTPATIENT
Start: 2023-05-17 | End: 2023-05-31

## 2023-04-26 RX ORDER — RAMIPRIL 5 MG
1 CAPSULE ORAL
Qty: 0 | Refills: 0 | DISCHARGE

## 2023-04-26 RX ORDER — SODIUM CHLORIDE 9 MG/ML
3 INJECTION INTRAMUSCULAR; INTRAVENOUS; SUBCUTANEOUS EVERY 8 HOURS
Refills: 0 | Status: DISCONTINUED | OUTPATIENT
Start: 2023-05-17 | End: 2023-05-31

## 2023-04-26 RX ORDER — LIDOCAINE HCL 20 MG/ML
0.2 VIAL (ML) INJECTION ONCE
Refills: 0 | Status: DISCONTINUED | OUTPATIENT
Start: 2023-05-17 | End: 2023-05-31

## 2023-04-26 RX ORDER — METFORMIN HYDROCHLORIDE 850 MG/1
1500 TABLET ORAL
Qty: 0 | Refills: 0 | DISCHARGE

## 2023-04-26 RX ORDER — METFORMIN HYDROCHLORIDE 850 MG/1
1 TABLET ORAL
Refills: 0 | DISCHARGE

## 2023-04-26 NOTE — H&P PST ADULT - PROBLEM SELECTOR PLAN 1
LEFT Ureteroscopy, Laser Lithotripsy, Stone Extraction, Stent Placement  -cbc, bmp, A1c, urine culture done @ PST  -medical evaluation  -preop instructions provided  -instructed to hold metformin 24 hours preop  -fingerstick on admit -cbc, bmp, A1c, urine culture done @ PST  -medical evaluation  -preop instructions provided  -instructed to hold metformin 24 hours preop  -fingerstick on admit

## 2023-04-26 NOTE — H&P PST ADULT - NEGATIVE CARDIOVASCULAR SYMPTOMS
no chest pain/no dyspnea on exertion/no orthopnea/no paroxysmal nocturnal dyspnea/no peripheral edema no chest pain/no palpitations/no dyspnea on exertion/no orthopnea/no paroxysmal nocturnal dyspnea/no peripheral edema

## 2023-04-26 NOTE — H&P PST ADULT - ANESTHESIA, PREVIOUS REACTION, PROFILE
Sheath #1: Presents as clean, dry, & intact, no bleeding and no hematoma. patient states 'has never had anesthesia'/unknown

## 2023-04-26 NOTE — H&P PST ADULT - GENERAL
[Routine Follow-Up] : routine follow-up visit for [Prostate Cancer] : prostate cancer [Family Member] : family member details…

## 2023-04-26 NOTE — H&P PST ADULT - HISTORY OF PRESENT ILLNESS
77 year old male with PMH of HTN, T2DM, HLD, Prostate Cancer (watchful waiting), Essential Tremors, Retinal Artery Thrombosis, Diabetic Retinopathy, Legally Blind with   He endorse intermittent hematuria,   He denies fever, chills, nausea/vomiting, flank pain, dysuria,   He presents 77 year old male with PMH of HTN, T2DM, HLD, Prostate Cancer (active surveillance and watchful waiting), Essential Tremors, Central Artery Occlusion left eye, Diabetic Retinopathy, Legally Blind with kidney stone. Patient endorses intermittent hematuria. He underwent CT scan which  demonstrated a 1.4 cm left kidney stone. He denies fever, chills, nausea/vomiting, flank pain, dysuria, He presents presents to PST today prior to scheduled LEFT Ureteroscopy, Laser Lithotripsy, Stone Extraction, Stent Placement on 5/17/2023.

## 2023-04-26 NOTE — H&P PST ADULT - NSICDXPASTMEDICALHX_GEN_ALL_CORE_FT
PAST MEDICAL HISTORY:  DM2 (diabetes mellitus, type 2)     Essential tremor     H/O diabetic retinopathy     HLD (hyperlipidemia)     HTN (hypertension)     Legally blind     Prostate cancer     Retinal artery thrombosis      PAST MEDICAL HISTORY:  Central retinal artery occlusion, left     DM2 (diabetes mellitus, type 2)     Essential tremor     H/O diabetic retinopathy     HLD (hyperlipidemia)     HTN (hypertension)     Legally blind     Prostate cancer

## 2023-04-26 NOTE — H&P PST ADULT - SKIN COMMENTS
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

## 2023-04-26 NOTE — H&P PST ADULT - ATTENDING COMMENTS
Patient with large left renal calculus. Patient wishes to proceed with left ureteroscopy, laser lithotripsy, stone extraction, stent placement. Informed consent was obtained. Alternatives were given. Risks including but not limited to bleeding, infection, risk of anesthesia, pain, failure to cure, negative ureteroscopy, stone migration, need for future procedure, and injury to surrounding structures were discussed. Patient wishes to proceed with procedure.

## 2023-04-26 NOTE — H&P PST ADULT - NEGATIVE NEUROLOGICAL SYMPTOMS
no weakness/no paresthesias/no focal seizures/no syncope/no difficulty walking/no headache/no hemiparesis/no confusion

## 2023-04-26 NOTE — H&P PST ADULT - MUSCULOSKELETAL
ROM intact/no joint erythema/no joint warmth/no calf tenderness/normal gait/strength 5/5 bilateral upper extremities/strength 5/5 bilateral lower extremities details…

## 2023-04-27 LAB
A1C WITH ESTIMATED AVERAGE GLUCOSE RESULT: 6.1 % — HIGH (ref 4–5.6)
ESTIMATED AVERAGE GLUCOSE: 128 MG/DL — HIGH (ref 68–114)

## 2023-04-28 ENCOUNTER — APPOINTMENT (OUTPATIENT)
Dept: INTERNAL MEDICINE | Facility: CLINIC | Age: 78
End: 2023-04-28
Payer: MEDICARE

## 2023-04-28 VITALS
SYSTOLIC BLOOD PRESSURE: 170 MMHG | DIASTOLIC BLOOD PRESSURE: 74 MMHG | OXYGEN SATURATION: 97 % | TEMPERATURE: 97.6 F | HEART RATE: 65 BPM

## 2023-04-28 DIAGNOSIS — Z01.818 ENCOUNTER FOR OTHER PREPROCEDURAL EXAMINATION: ICD-10-CM

## 2023-04-28 LAB
CULTURE RESULTS: SIGNIFICANT CHANGE UP
SPECIMEN SOURCE: SIGNIFICANT CHANGE UP

## 2023-04-28 PROCEDURE — 99214 OFFICE O/P EST MOD 30 MIN: CPT

## 2023-04-28 RX ORDER — MULTIVITAMIN
TABLET ORAL
Refills: 0 | Status: ACTIVE | COMMUNITY
Start: 2023-04-28

## 2023-04-28 RX ORDER — IBUPROFEN 200 MG/1
200 TABLET ORAL 3 TIMES DAILY
Refills: 0 | Status: COMPLETED | COMMUNITY
Start: 2021-12-10 | End: 2023-04-28

## 2023-04-28 NOTE — RESULTS/DATA
[] : results reviewed [ECG Reviewed] : reviewed [Ventricular Rate___] : ventricular rate is [unfilled] beats per minute [Sinus Bradycardia] : sinus bradycardia [ECG Intervals ME.] : ME interval is normal [Normal QRS] : the QRS is normal [ECG Axis] : the QRS axis is normal [Normal ST Segments] : the ST segments are normal [No Interval Change] : no interval change [de-identified] : Hb - 12.6(L), Hct - 39.1, the rest of CBC were normal  [de-identified] : BUN - 24(H), Cr - 0.93(N), the rest of BMP were normal  [de-identified] : HbA1c - 6.1\par UCx - negative

## 2023-04-28 NOTE — ASSESSMENT
[Patient Optimized for Surgery] : Patient optimized for surgery [No Further Testing Recommended] : no further testing recommended [Modify anti-platelet treatment prior to procedure] : Modify anti-platelet treatment prior to procedure [Continue medications as is] : Continue current medications [As per surgery] : as per surgery [FreeTextEntry6] : Pt to hold ASA 1 week prior to surgery [FreeTextEntry7] : Pt will take only Metoprolol, Ramipril and Nifedipine with sips of water on the morning of surgery.

## 2023-04-28 NOTE — CONSULT LETTER
[Dear  ___] : Dear  [unfilled], [Consult Letter:] : I had the pleasure of evaluating your patient, [unfilled]. [Please see my note below.] : Please see my note below. [Consult Closing:] : Thank you very much for allowing me to participate in the care of this patient.  If you have any questions, please do not hesitate to contact me. [Sincerely,] : Sincerely, [FreeTextEntry1] : I saw the patient today for preoperative evaluation.  [FreeTextEntry3] : Emily Tuttle MD

## 2023-04-28 NOTE — HISTORY OF PRESENT ILLNESS
[No Pertinent Cardiac History] : no history of aortic stenosis, atrial fibrillation, coronary artery disease, recent myocardial infarction, or implantable device/pacemaker [No Pertinent Pulmonary History] : no history of asthma, COPD, sleep apnea, or smoking [No Adverse Anesthesia Reaction] : no adverse anesthesia reaction in self or family member [Diabetes] : diabetes [Moderate (4-6 METs)] : Moderate (4-6 METs) [Anti-Platelet Agents: _____] : Anti-Platelet Agents: [unfilled] [Spouse] : spouse [(Patient denies any chest pain, claudication, dyspnea on exertion, orthopnea, palpitations or syncope)] : Patient denies any chest pain, claudication, dyspnea on exertion, orthopnea, palpitations or syncope [Chronic Anticoagulation] : no chronic anticoagulation [Chronic Kidney Disease] : no chronic kidney disease [FreeTextEntry1] : L ureteroscopy, laser lithotripsy, stone extraction and stent placement [FreeTextEntry2] : 5/17/2023, Wednesday [FreeTextEntry3] : Dr. Omar Flores [FreeTextEntry4] : Pt has been having intermittent gross hematuria for over 1 1/2 year.  He saw urologist is 12/2022, work up included negative cystoscopy and urine cytology.  He had CT scan showing 2 stones in L kidney, but no obstruction.  Pt has no pain, but gross hematuria did not ira.  He was offered surgical intervention which he declined at first, but now due to persistent hematuria.  Pt decided to proceed with surgery to have lithotripsy & stone extraction.\par \par Pt feels well w/o any new complaint. He has not been sick lately and is very active.

## 2023-04-28 NOTE — PHYSICAL EXAM
[No Acute Distress] : no acute distress [Well Nourished] : well nourished [Well Developed] : well developed [Normal Sclera/Conjunctiva] : normal sclera/conjunctiva [PERRL] : pupils equal round and reactive to light [EOMI] : extraocular movements intact [Normal Outer Ear/Nose] : the outer ears and nose were normal in appearance [Normal Oropharynx] : the oropharynx was normal [Normal TMs] : both tympanic membranes were normal [Normal Nasal Mucosa] : the nasal mucosa was normal [No JVD] : no jugular venous distention [No Lymphadenopathy] : no lymphadenopathy [Supple] : supple [No Respiratory Distress] : no respiratory distress  [Clear to Auscultation] : lungs were clear to auscultation bilaterally [Normal Rate] : normal rate  [Regular Rhythm] : with a regular rhythm [Normal S1, S2] : normal S1 and S2 [No Carotid Bruits] : no carotid bruits [Pedal Pulses Present] : the pedal pulses are present [No Edema] : there was no peripheral edema [No Extremity Clubbing/Cyanosis] : no extremity clubbing/cyanosis [Soft] : abdomen soft [Non Tender] : non-tender [Non-distended] : non-distended [Normal Bowel Sounds] : normal bowel sounds [Normal Posterior Cervical Nodes] : no posterior cervical lymphadenopathy [No CVA Tenderness] : no CVA  tenderness [Normal Anterior Cervical Nodes] : no anterior cervical lymphadenopathy [No Spinal Tenderness] : no spinal tenderness [No Joint Swelling] : no joint swelling [Grossly Normal Strength/Tone] : grossly normal strength/tone [Coordination Grossly Intact] : coordination grossly intact [No Rash] : no rash [No Focal Deficits] : no focal deficits [Normal Gait] : normal gait [Normal Affect] : the affect was normal [Speech Grossly Normal] : speech grossly normal [Alert and Oriented x3] : oriented to person, place, and time [Normal Mood] : the mood was normal [de-identified] : Elderly male in stated age,  [de-identified] : Very poor vision,

## 2023-05-03 ENCOUNTER — RX RENEWAL (OUTPATIENT)
Age: 78
End: 2023-05-03

## 2023-05-16 ENCOUNTER — TRANSCRIPTION ENCOUNTER (OUTPATIENT)
Age: 78
End: 2023-05-16

## 2023-05-17 ENCOUNTER — APPOINTMENT (OUTPATIENT)
Dept: UROLOGY | Facility: HOSPITAL | Age: 78
End: 2023-05-17

## 2023-05-17 ENCOUNTER — RESULT REVIEW (OUTPATIENT)
Age: 78
End: 2023-05-17

## 2023-05-17 ENCOUNTER — TRANSCRIPTION ENCOUNTER (OUTPATIENT)
Age: 78
End: 2023-05-17

## 2023-05-17 ENCOUNTER — OUTPATIENT (OUTPATIENT)
Dept: OUTPATIENT SERVICES | Facility: HOSPITAL | Age: 78
LOS: 1 days | End: 2023-05-17
Payer: MEDICARE

## 2023-05-17 VITALS
TEMPERATURE: 98 F | SYSTOLIC BLOOD PRESSURE: 145 MMHG | RESPIRATION RATE: 16 BRPM | HEART RATE: 50 BPM | OXYGEN SATURATION: 100 % | HEIGHT: 64 IN | DIASTOLIC BLOOD PRESSURE: 77 MMHG | WEIGHT: 115.96 LBS

## 2023-05-17 VITALS
RESPIRATION RATE: 17 BRPM | OXYGEN SATURATION: 100 % | HEART RATE: 55 BPM | SYSTOLIC BLOOD PRESSURE: 145 MMHG | DIASTOLIC BLOOD PRESSURE: 60 MMHG

## 2023-05-17 DIAGNOSIS — C61 MALIGNANT NEOPLASM OF PROSTATE: ICD-10-CM

## 2023-05-17 DIAGNOSIS — R31.0 GROSS HEMATURIA: ICD-10-CM

## 2023-05-17 LAB — GLUCOSE BLDC GLUCOMTR-MCNC: 125 MG/DL — HIGH (ref 70–99)

## 2023-05-17 PROCEDURE — 74420 UROGRAPHY RTRGR +-KUB: CPT | Mod: 26

## 2023-05-17 PROCEDURE — 88300 SURGICAL PATH GROSS: CPT | Mod: 26

## 2023-05-17 PROCEDURE — 52356 CYSTO/URETERO W/LITHOTRIPSY: CPT | Mod: LT,22

## 2023-05-17 PROCEDURE — 52341 CYSTO W/URETER STRICTURE TX: CPT | Mod: LT,59

## 2023-05-17 DEVICE — BALLOON CATH UROMAX ULTRA KIT 18FR X 4CM: Type: IMPLANTABLE DEVICE | Status: FUNCTIONAL

## 2023-05-17 DEVICE — KIT URET PERFLX PLUS 6FRX26CM: Type: IMPLANTABLE DEVICE | Status: FUNCTIONAL

## 2023-05-17 DEVICE — IMPLANTABLE DEVICE: Type: IMPLANTABLE DEVICE | Status: FUNCTIONAL

## 2023-05-17 DEVICE — GUIDEWIRE SENSOR DUAL-FLEX NITINOL STRAIGHT .035" X 150CM: Type: IMPLANTABLE DEVICE | Status: FUNCTIONAL

## 2023-05-17 DEVICE — URETERAL SHEATH NAVIGATOR 11/13FR X 36CM: Type: IMPLANTABLE DEVICE | Status: FUNCTIONAL

## 2023-05-17 DEVICE — LASER FIBER SOLTIVE 200 BALL TIP: Type: IMPLANTABLE DEVICE | Status: FUNCTIONAL

## 2023-05-17 DEVICE — STONE BASKET ZEROTIP NITINOL 4-WIRE 1.9FR 120CM X 12MM: Type: IMPLANTABLE DEVICE | Status: FUNCTIONAL

## 2023-05-17 RX ORDER — RAMIPRIL 5 MG
10 CAPSULE ORAL
Refills: 0 | DISCHARGE

## 2023-05-17 RX ORDER — METOPROLOL TARTRATE 50 MG
1 TABLET ORAL
Qty: 0 | Refills: 0 | DISCHARGE

## 2023-05-17 RX ORDER — ROSUVASTATIN CALCIUM 5 MG/1
1 TABLET ORAL
Refills: 0 | DISCHARGE

## 2023-05-17 RX ORDER — CEPHALEXIN 500 MG
1 CAPSULE ORAL
Qty: 9 | Refills: 0
Start: 2023-05-17 | End: 2023-05-19

## 2023-05-17 RX ORDER — METFORMIN HYDROCHLORIDE 850 MG/1
1 TABLET ORAL
Refills: 0 | DISCHARGE

## 2023-05-17 RX ORDER — CEFAZOLIN SODIUM 1 G
2000 VIAL (EA) INJECTION ONCE
Refills: 0 | Status: COMPLETED | OUTPATIENT
Start: 2023-05-17 | End: 2023-05-17

## 2023-05-17 RX ADMIN — SODIUM CHLORIDE 100 MILLILITER(S): 9 INJECTION, SOLUTION INTRAVENOUS at 06:16

## 2023-05-17 NOTE — ASU PATIENT PROFILE, ADULT - FALL HARM RISK - RISK INTERVENTIONS

## 2023-05-17 NOTE — ASU DISCHARGE PLAN (ADULT/PEDIATRIC) - ASU DC SPECIAL INSTRUCTIONSFT
Take tylenol or motrin for pain control and the antibiotics as prescribed.  Follow up with Dr Flores within 2 weeks for your left ureteral stent to be removed.  Notify the office if you develop any fevers greater than 101F or intractable pain/nausea/vomiting.

## 2023-05-17 NOTE — ASU PATIENT PROFILE, ADULT - NSICDXPASTMEDICALHX_GEN_ALL_CORE_FT
PAST MEDICAL HISTORY:  Central retinal artery occlusion, left     DM2 (diabetes mellitus, type 2)     Essential tremor     H/O diabetic retinopathy     HLD (hyperlipidemia)     HTN (hypertension)     Legally blind     Prostate cancer

## 2023-05-17 NOTE — ASU DISCHARGE PLAN (ADULT/PEDIATRIC) - NURSING INSTRUCTIONS
Next dose of tylenol at/after_0115pm___. Do not exceed 4000mg in a 24hour  period. Every 6hours as needed.

## 2023-05-21 NOTE — ASU DISCHARGE PLAN (ADULT/PEDIATRIC) - FREQUENT HAND WASHING PREVENTS THE SPREAD OF INFECTION.
Statement Selected

## 2023-05-23 PROBLEM — H54.8 LEGAL BLINDNESS, AS DEFINED IN USA: Chronic | Status: ACTIVE | Noted: 2023-04-26

## 2023-05-23 PROBLEM — Z86.39 PERSONAL HISTORY OF OTHER ENDOCRINE, NUTRITIONAL AND METABOLIC DISEASE: Chronic | Status: ACTIVE | Noted: 2023-04-26

## 2023-05-23 PROBLEM — G25.0 ESSENTIAL TREMOR: Chronic | Status: ACTIVE | Noted: 2023-04-26

## 2023-05-23 PROBLEM — C61 MALIGNANT NEOPLASM OF PROSTATE: Chronic | Status: ACTIVE | Noted: 2023-04-26

## 2023-05-23 PROBLEM — H34.12 CENTRAL RETINAL ARTERY OCCLUSION, LEFT EYE: Chronic | Status: ACTIVE | Noted: 2023-04-26

## 2023-05-23 PROBLEM — E78.5 HYPERLIPIDEMIA, UNSPECIFIED: Chronic | Status: ACTIVE | Noted: 2023-04-26

## 2023-05-24 PROCEDURE — 82365 CALCULUS SPECTROSCOPY: CPT

## 2023-05-24 PROCEDURE — C1769: CPT

## 2023-05-24 PROCEDURE — C1894: CPT

## 2023-05-24 PROCEDURE — 52356 CYSTO/URETERO W/LITHOTRIPSY: CPT | Mod: LT

## 2023-05-24 PROCEDURE — C1758: CPT

## 2023-05-24 PROCEDURE — C2617: CPT

## 2023-05-24 PROCEDURE — 76000 FLUOROSCOPY <1 HR PHYS/QHP: CPT

## 2023-05-24 PROCEDURE — C1726: CPT

## 2023-05-24 PROCEDURE — 82962 GLUCOSE BLOOD TEST: CPT

## 2023-05-24 PROCEDURE — 88300 SURGICAL PATH GROSS: CPT

## 2023-05-24 PROCEDURE — C1889: CPT

## 2023-05-28 ENCOUNTER — OUTPATIENT (OUTPATIENT)
Dept: OUTPATIENT SERVICES | Facility: HOSPITAL | Age: 78
LOS: 1 days | End: 2023-05-28
Payer: MEDICARE

## 2023-05-28 ENCOUNTER — APPOINTMENT (OUTPATIENT)
Dept: RADIOLOGY | Facility: IMAGING CENTER | Age: 78
End: 2023-05-28
Payer: MEDICARE

## 2023-05-28 DIAGNOSIS — N20.0 CALCULUS OF KIDNEY: ICD-10-CM

## 2023-05-28 PROCEDURE — 74018 RADEX ABDOMEN 1 VIEW: CPT

## 2023-05-28 PROCEDURE — 74018 RADEX ABDOMEN 1 VIEW: CPT | Mod: 26

## 2023-06-01 ENCOUNTER — APPOINTMENT (OUTPATIENT)
Dept: UROLOGY | Facility: CLINIC | Age: 78
End: 2023-06-01
Payer: MEDICARE

## 2023-06-01 PROCEDURE — 99214 OFFICE O/P EST MOD 30 MIN: CPT

## 2023-06-02 LAB
CELL MATERIAL STONE EST-MCNT: SIGNIFICANT CHANGE UP
NIDUS STONE QN: SIGNIFICANT CHANGE UP

## 2023-06-03 NOTE — LETTER BODY
[FreeTextEntry1] : Emily Tuttle MD\par [address] [facility], [address], \par [place], [place]\par [phone]\par \par Dear Dr. Tuttle, \par \par Reason for Visit: Prostate cancer on active surveillance. Kidney stones. 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 Patient's previous prostate biopsy demonstrated Talcott 7 prostate cancer involving 2 of 15 cores. He previously had hematuria. Patient's previous urine cytology was negative. Patient returns today for follow up. His previous CT can demonstrated two large kidney stones, sized 1.4 cm and 9 mm. He had a left ureteroscopy 2 weeks ago. He recently had a KUB X-ray. He denies any fevers or chills. He denies any pain. Patient denies any changes in health. The past medical history and family history and social history are unchanged. All other review of systems are negative. Patient denies any changes in medications. Medication list was reconciled.\par \par On examination, the patient is a well-appearing gentleman in no acute distress. He is alert and oriented follows commands. He has normal mood and affect. He is normocephalic. Neck is supple. Oral no thrush Respirations are unlabored. His abdomen is soft and nontender. Bladder is nonpalpable. No CVA tenderness. Neurologically he is grossly intact. No peripheral edema. Skin without gross abnormality. He has normal male external genitalia. Normal meatus. Bilateral testes are descended intrascrotally and normal to palpation. On rectal examination, there is normal sphincter tone. The prostate is clinically benign without focal induration or nodularity.\par \par His KUB X ray demonstrated significant improvement in stone burden. There are remaining fragments in the lower pole. \par \par Assessment: Prostate cancer. Kidney stone. Hematuria. \par \par I counseled the patient. In terms of his prostate cancer, he wishes to continue with active surveillance and watchful waiting. In terms of his kidney stone, his KUB X ray demonstrated significant improvement in stone burden. I encouraged the patient to maintain hydration. He will follow up in 2 weeks for a repeat KUB X ray. Risks and benefits were discussed. Alternatives were given. I answered the patient questions. The patient will follow-up as directed and will contact me with any questions or concerns. \par \par Plan: Active surveillance of prostate cancer.  Follow up in 2 weeks for repeat KUB X ray.

## 2023-06-16 ENCOUNTER — APPOINTMENT (OUTPATIENT)
Dept: RADIOLOGY | Facility: IMAGING CENTER | Age: 78
End: 2023-06-16
Payer: MEDICARE

## 2023-06-16 ENCOUNTER — OUTPATIENT (OUTPATIENT)
Dept: OUTPATIENT SERVICES | Facility: HOSPITAL | Age: 78
LOS: 1 days | End: 2023-06-16
Payer: MEDICARE

## 2023-06-16 DIAGNOSIS — N20.0 CALCULUS OF KIDNEY: ICD-10-CM

## 2023-06-16 PROCEDURE — 74018 RADEX ABDOMEN 1 VIEW: CPT | Mod: 26

## 2023-06-16 PROCEDURE — 74018 RADEX ABDOMEN 1 VIEW: CPT

## 2023-06-29 ENCOUNTER — APPOINTMENT (OUTPATIENT)
Dept: UROLOGY | Facility: CLINIC | Age: 78
End: 2023-06-29
Payer: MEDICARE

## 2023-06-29 ENCOUNTER — OUTPATIENT (OUTPATIENT)
Dept: OUTPATIENT SERVICES | Facility: HOSPITAL | Age: 78
LOS: 1 days | End: 2023-06-29
Payer: MEDICARE

## 2023-06-29 VITALS
BODY MASS INDEX: 20.83 KG/M2 | HEART RATE: 58 BPM | WEIGHT: 122 LBS | OXYGEN SATURATION: 100 % | RESPIRATION RATE: 16 BRPM | DIASTOLIC BLOOD PRESSURE: 78 MMHG | SYSTOLIC BLOOD PRESSURE: 154 MMHG | HEIGHT: 64 IN

## 2023-06-29 DIAGNOSIS — R35.0 FREQUENCY OF MICTURITION: ICD-10-CM

## 2023-06-29 DIAGNOSIS — D64.9 ANEMIA, UNSPECIFIED: ICD-10-CM

## 2023-06-29 DIAGNOSIS — R31.21 ASYMPTOMATIC MICROSCOPIC HEMATURIA: ICD-10-CM

## 2023-06-29 PROCEDURE — 99213 OFFICE O/P EST LOW 20 MIN: CPT | Mod: 25

## 2023-06-29 PROCEDURE — 52310 CYSTOSCOPY AND TREATMENT: CPT

## 2023-06-29 PROCEDURE — 88112 CYTOPATH CELL ENHANCE TECH: CPT | Mod: 26

## 2023-07-01 LAB
APPEARANCE: ABNORMAL
BACTERIA: NEGATIVE /HPF
BILIRUBIN URINE: NEGATIVE
BLOOD URINE: ABNORMAL
CAST: 1 /LPF
COLOR: YELLOW
EPITHELIAL CELLS: 2 /HPF
GLUCOSE QUALITATIVE U: NEGATIVE MG/DL
KETONES URINE: NEGATIVE MG/DL
LEUKOCYTE ESTERASE URINE: ABNORMAL
MICROSCOPIC-UA: NORMAL
NITRITE URINE: NEGATIVE
PH URINE: 6
PROTEIN URINE: 300 MG/DL
RED BLOOD CELLS URINE: 1115 /HPF
SPECIFIC GRAVITY URINE: 1.02
URINE CYTOLOGY: NORMAL
UROBILINOGEN URINE: 0.2 MG/DL
WHITE BLOOD CELLS URINE: 32 /HPF

## 2023-07-01 NOTE — LETTER BODY
[FreeTextEntry1] : Emily Tuttle MD\par [address] [facility], [address], \par [place], [place]\par [phone]\par \par Dear Dr. Tuttle, \par \par Reason for Visit: Prostate cancer on active surveillance. Kidney stones. 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 Patient had kidney stone surgery 2 months ago. He had a L nephroureteral stent placement. Patient's previous prostate biopsy demonstrated Anibal 7 prostate cancer involving 2 of 15 cores. He previously had hematuria. Patient's previous urine cytology was negative. Patient returns today for follow up. His previous CT can demonstrated two large kidney stones, sized 1.4 cm and 9 mm. He had a left ureteroscopy. He recently had a KUB X-ray. Repeat KUB X-ray demonstrates improvement in stone burden, but there are a few small fragments in L lower pole.  He denies any fevers or chills. He denies any pain. Patient denies any changes in health. The past medical history and family history and social history are unchanged. All other review of systems are negative. Patient denies any changes in medications. Medication list was reconciled.\par \par On examination, the patient is a well-appearing gentleman in no acute distress. He is alert and oriented follows commands. He has normal mood and affect. He is normocephalic. Neck is supple. Oral no thrush Respirations are unlabored. His abdomen is soft and nontender. Bladder is nonpalpable. No CVA tenderness. Neurologically he is grossly intact. No peripheral edema. Skin without gross abnormality. He has normal male external genitalia. Normal meatus. Bilateral testes are descended intrascrotally and normal to palpation. On rectal examination, there is normal sphincter tone. The prostate is clinically benign without focal induration or nodularity.\par \par Repeat KUB X ray demonstrated significant improvement in stone burden. There are remaining fragments in the lower pole. \par \par Assessment: Prostate cancer. Kidney stone. Hematuria. \par \par I counseled the patient. In terms of his prostate cancer, he wishes to continue with active surveillance and watchful waiting. In terms of his kidney stone, his KUB X ray demonstrated significant improvement in stone burden. I encouraged the patient to maintain hydration. Risks and benefits were discussed. Alternatives were given. I answered the patient questions. The patient will follow-up as directed and will contact me with any questions or concerns. \par \par Plan: Active surveillance of prostate cancer.  L stent removal.

## 2023-07-01 NOTE — ADDENDUM
[FreeTextEntry1] : Entered by Rita Escoto, acting as scribe for Dr. Omar Flores.\par The documentation recorded by the scribe accurately reflects the service I personally performed and the decisions made by me.

## 2023-07-03 DIAGNOSIS — N20.0 CALCULUS OF KIDNEY: ICD-10-CM

## 2023-07-03 DIAGNOSIS — C61 MALIGNANT NEOPLASM OF PROSTATE: ICD-10-CM

## 2023-07-03 LAB — SURGICAL PATHOLOGY STUDY: SIGNIFICANT CHANGE UP

## 2023-07-06 ENCOUNTER — RX RENEWAL (OUTPATIENT)
Age: 78
End: 2023-07-06

## 2023-08-10 ENCOUNTER — OUTPATIENT (OUTPATIENT)
Dept: OUTPATIENT SERVICES | Facility: HOSPITAL | Age: 78
LOS: 1 days | End: 2023-08-10
Payer: MEDICARE

## 2023-08-10 ENCOUNTER — APPOINTMENT (OUTPATIENT)
Dept: UROLOGY | Facility: CLINIC | Age: 78
End: 2023-08-10
Payer: MEDICARE

## 2023-08-10 DIAGNOSIS — R35.0 FREQUENCY OF MICTURITION: ICD-10-CM

## 2023-08-10 PROCEDURE — 76775 US EXAM ABDO BACK WALL LIM: CPT | Mod: 26

## 2023-08-10 PROCEDURE — 99214 OFFICE O/P EST MOD 30 MIN: CPT

## 2023-08-10 PROCEDURE — 76775 US EXAM ABDO BACK WALL LIM: CPT

## 2023-08-11 DIAGNOSIS — N20.0 CALCULUS OF KIDNEY: ICD-10-CM

## 2023-08-11 DIAGNOSIS — C61 MALIGNANT NEOPLASM OF PROSTATE: ICD-10-CM

## 2023-08-12 NOTE — HISTORY OF PRESENT ILLNESS
[FreeTextEntry1] : Follow-up kidney stone.  I personally reviewed the ultrasound scan with patient today. Ultrasound demonstrated nonobstructing punctate stones in the left kidney.  There is no obvious hydronephrosis.  Patient is asymptomatic.  I encouraged patient to maintain a low-protein low-sodium diet. I also encouraged hydration to prevent stone formation.  Renal ultrasound in 6 months.  Follow-up prostate cancer.  Patient is on active surveillance.  Patient return in 6 months with PSA testing.    Please refer to URO Consult note

## 2023-08-12 NOTE — ADDENDUM
[FreeTextEntry1] : Entered by Heather Campos, acting as scribe for Dr. Omar Flores. The documentation recorded by the scribe accurately reflects the service I personally performed and the decisions made by me.

## 2023-08-12 NOTE — LETTER BODY
[FreeTextEntry1] : Emily Tuttle MD [address] [facility], [address], [place], [place] [phone]   Dear Dr. Tuttle,   Reason for Visit: Prostate cancer on active surveillance. Kidney stones.   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 Patient had kidney stone surgery 2 months ago. Patient's previous prostate biopsy demonstrated Anibal 7 prostate cancer involving 2 of 15 cores. He previously had hematuria. His previous CT scan demonstrated two large kidney stones, sized 1.4 cm and 9 mm.  his KUB X ray demonstrated significant improvement in stone burden. Patient returns today for follow up. His ultrasound today demonstrated nonobstructing punctuate stones in the left kidney. He denies any fevers or chills. He denies any pain. Patient denies any changes in health. The past medical history and family history and social history are unchanged. All other review of systems are negative. Patient denies any changes in medications. Medication list was reconciled.    On examination, the patient is a well-appearing gentleman in no acute distress. He is alert and oriented follows commands. He has normal mood and affect. He is normocephalic. Neck is supple. Oral no thrush Respirations are unlabored. His abdomen is soft and nontender. Bladder is nonpalpable. No CVA tenderness. Neurologically he is grossly intact. No peripheral edema. Skin without gross abnormality. He has normal male external genitalia. Normal meatus. Bilateral testes are descended intrascrotally and normal to palpation. On rectal examination, there is normal sphincter tone. The prostate is clinically benign without focal induration or nodularity.   I personally reviewed the ultrasound scan with patient today. Ultrasound demonstrated nonobstructing punctate stones in the left kidney. There is no obvious hydronephrosis.   Assessment: Prostate cancer. Kidney stone. Hematuria.    I counseled the patient.  His ultrasound today demonstrated nonobstructing punctuate stones in the left kidney. Patient is asymptomatic. I encouraged patient to maintain a low-protein low-sodium diet. I also encouraged hydration to prevent stone formation. He will undergo renal ultrasound in 6 months. I counseled the patient regarding the procedure. The risks and benefits were discussed. Alternatives were given. I answered the patient questions. The patient will take the necessary preparations for the procedure. In terms of  prostate cancer, patient is on active surveillance. Patient return in 6 months with PSA testing. Risks and benefits were discussed. Alternatives were given. I answered the patient questions. The patient will follow-up as directed and will contact me with any questions or concerns.    Plan: Active surveillance of prostate cancer. Renal Ultrasound. Stone diet. Hydration. PSA. BMP. Follow up in 6 months

## 2023-09-20 ENCOUNTER — RX RENEWAL (OUTPATIENT)
Age: 78
End: 2023-09-20

## 2023-11-06 ENCOUNTER — RX RENEWAL (OUTPATIENT)
Age: 78
End: 2023-11-06

## 2023-12-26 ENCOUNTER — RX RENEWAL (OUTPATIENT)
Age: 78
End: 2023-12-26

## 2024-01-02 ENCOUNTER — TRANSCRIPTION ENCOUNTER (OUTPATIENT)
Age: 79
End: 2024-01-02

## 2024-01-05 LAB
ALBUMIN SERPL ELPH-MCNC: 4.6 G/DL
ALP BLD-CCNC: 81 U/L
ALT SERPL-CCNC: 21 U/L
ANION GAP SERPL CALC-SCNC: 13 MMOL/L
AST SERPL-CCNC: 25 U/L
BASOPHILS # BLD AUTO: 0.1 K/UL
BASOPHILS NFR BLD AUTO: 1.3 %
BILIRUB SERPL-MCNC: 0.4 MG/DL
BUN SERPL-MCNC: 24 MG/DL
CALCIUM SERPL-MCNC: 9.5 MG/DL
CHLORIDE SERPL-SCNC: 105 MMOL/L
CHOLEST SERPL-MCNC: 132 MG/DL
CK SERPL-CCNC: 92 U/L
CO2 SERPL-SCNC: 23 MMOL/L
CREAT SERPL-MCNC: 1.12 MG/DL
EGFR: 68 ML/MIN/1.73M2
EOSINOPHIL # BLD AUTO: 0.33 K/UL
EOSINOPHIL NFR BLD AUTO: 4.2 %
ESTIMATED AVERAGE GLUCOSE: 126 MG/DL
FERRITIN SERPL-MCNC: 23 NG/ML
FOLATE SERPL-MCNC: >20 NG/ML
GLUCOSE SERPL-MCNC: 116 MG/DL
HBA1C MFR BLD HPLC: 6 %
HCT VFR BLD CALC: 40.4 %
HDLC SERPL-MCNC: 69 MG/DL
HGB BLD-MCNC: 13.1 G/DL
IMM GRANULOCYTES NFR BLD AUTO: 0.4 %
IRON SATN MFR SERPL: 15 %
IRON SERPL-MCNC: 60 UG/DL
LDLC SERPL CALC-MCNC: 49 MG/DL
LYMPHOCYTES # BLD AUTO: 3.54 K/UL
LYMPHOCYTES NFR BLD AUTO: 44.9 %
MAN DIFF?: NORMAL
MCHC RBC-ENTMCNC: 27.6 PG
MCHC RBC-ENTMCNC: 32.4 GM/DL
MCV RBC AUTO: 85.2 FL
MONOCYTES # BLD AUTO: 0.75 K/UL
MONOCYTES NFR BLD AUTO: 9.5 %
NEUTROPHILS # BLD AUTO: 3.13 K/UL
NEUTROPHILS NFR BLD AUTO: 39.7 %
NONHDLC SERPL-MCNC: 63 MG/DL
PLATELET # BLD AUTO: 254 K/UL
POTASSIUM SERPL-SCNC: 4.6 MMOL/L
PROT SERPL-MCNC: 6.8 G/DL
PSA SERPL-MCNC: 16.3 NG/ML
RBC # BLD: 4.74 M/UL
RBC # FLD: 13.2 %
SODIUM SERPL-SCNC: 142 MMOL/L
T4 FREE SERPL-MCNC: 1.2 NG/DL
TIBC SERPL-MCNC: 399 UG/DL
TRIGL SERPL-MCNC: 64 MG/DL
TSH SERPL-ACNC: 7.71 UIU/ML
UIBC SERPL-MCNC: 340 UG/DL
VIT B12 SERPL-MCNC: 566 PG/ML
WBC # FLD AUTO: 7.88 K/UL

## 2024-01-15 ENCOUNTER — NON-APPOINTMENT (OUTPATIENT)
Age: 79
End: 2024-01-15

## 2024-01-15 ENCOUNTER — APPOINTMENT (OUTPATIENT)
Dept: INTERNAL MEDICINE | Facility: CLINIC | Age: 79
End: 2024-01-15
Payer: MEDICARE

## 2024-01-15 VITALS — DIASTOLIC BLOOD PRESSURE: 78 MMHG | SYSTOLIC BLOOD PRESSURE: 144 MMHG

## 2024-01-15 VITALS
BODY MASS INDEX: 20.49 KG/M2 | WEIGHT: 120 LBS | DIASTOLIC BLOOD PRESSURE: 80 MMHG | HEIGHT: 64 IN | OXYGEN SATURATION: 98 % | HEART RATE: 82 BPM | TEMPERATURE: 97 F | SYSTOLIC BLOOD PRESSURE: 156 MMHG

## 2024-01-15 DIAGNOSIS — G25.0 ESSENTIAL TREMOR: ICD-10-CM

## 2024-01-15 DIAGNOSIS — Z00.00 ENCOUNTER FOR GENERAL ADULT MEDICAL EXAMINATION W/OUT ABNORMAL FINDINGS: ICD-10-CM

## 2024-01-15 DIAGNOSIS — C61 MALIGNANT NEOPLASM OF PROSTATE: ICD-10-CM

## 2024-01-15 PROCEDURE — G0439: CPT

## 2024-01-15 PROCEDURE — 93000 ELECTROCARDIOGRAM COMPLETE: CPT

## 2024-01-15 PROCEDURE — 99214 OFFICE O/P EST MOD 30 MIN: CPT | Mod: 25

## 2024-01-15 RX ORDER — CEPHALEXIN 500 MG/1
500 CAPSULE ORAL
Qty: 9 | Refills: 0 | Status: COMPLETED | COMMUNITY
Start: 2023-05-17 | End: 2024-01-15

## 2024-01-15 RX ORDER — LEVOTHYROXINE SODIUM 0.03 MG/1
25 TABLET ORAL
Qty: 90 | Refills: 1 | Status: ACTIVE | COMMUNITY
Start: 2024-01-15 | End: 1900-01-01

## 2024-01-15 NOTE — PHYSICAL EXAM
[No Acute Distress] : no acute distress [Well Nourished] : well nourished [Well Developed] : well developed [Normal Sclera/Conjunctiva] : normal sclera/conjunctiva [PERRL] : pupils equal round and reactive to light [EOMI] : extraocular movements intact [Normal Outer Ear/Nose] : the outer ears and nose were normal in appearance [Normal Oropharynx] : the oropharynx was normal [Normal TMs] : both tympanic membranes were normal [Normal Nasal Mucosa] : the nasal mucosa was normal [No JVD] : no jugular venous distention [No Lymphadenopathy] : no lymphadenopathy [Supple] : supple [No Respiratory Distress] : no respiratory distress  [Clear to Auscultation] : lungs were clear to auscultation bilaterally [Normal Rate] : normal rate  [Regular Rhythm] : with a regular rhythm [Normal S1, S2] : normal S1 and S2 [No Carotid Bruits] : no carotid bruits [Pedal Pulses Present] : the pedal pulses are present [No Edema] : there was no peripheral edema [No Extremity Clubbing/Cyanosis] : no extremity clubbing/cyanosis [Normal Appearance] : normal in appearance [No Nipple Discharge] : no nipple discharge [No Masses] : no palpable masses [No Axillary Lymphadenopathy] : no axillary lymphadenopathy [Soft] : abdomen soft [Non Tender] : non-tender [Non-distended] : non-distended [Normal Bowel Sounds] : normal bowel sounds [Declined Rectal Exam] : declined rectal exam [No CVA Tenderness] : no CVA  tenderness [No Spinal Tenderness] : no spinal tenderness [No Joint Swelling] : no joint swelling [Grossly Normal Strength/Tone] : grossly normal strength/tone [No Rash] : no rash [Coordination Grossly Intact] : coordination grossly intact [No Focal Deficits] : no focal deficits [Normal Gait] : normal gait [Speech Grossly Normal] : speech grossly normal [Normal Affect] : the affect was normal [Alert and Oriented x3] : oriented to person, place, and time [Normal Mood] : the mood was normal [Normal Supraclavicular Nodes] : no supraclavicular lymphadenopathy [Normal Axillary Nodes] : no axillary lymphadenopathy [Normal Posterior Cervical Nodes] : no posterior cervical lymphadenopathy [Normal Anterior Cervical Nodes] : no anterior cervical lymphadenopathy [de-identified] : Elderly male in stated age,  [de-identified] : Very poor vision, [FreeTextEntry1] : deferred, pt is examined by  1-2 x per year,

## 2024-01-15 NOTE — REVIEW OF SYSTEMS
[Nocturia] : nocturia [Negative] : Heme/Lymph [Fever] : no fever [Chills] : no chills [Fatigue] : no fatigue [Recent Change In Weight] : ~T no recent weight change [Chest Pain] : no chest pain [Palpitations] : no palpitations [Lower Ext Edema] : no lower extremity edema [Shortness Of Breath] : no shortness of breath [Wheezing] : no wheezing [Cough] : no cough [Dyspnea on Exertion] : not dyspnea on exertion [Abdominal Pain] : no abdominal pain [Nausea] : no nausea [Constipation] : no constipation [Diarrhea] : no diarrhea [Vomiting] : no vomiting [Heartburn] : no heartburn [Melena] : no melena [Dysuria] : no dysuria [Incontinence] : no incontinence [Hematuria] : no hematuria [Joint Pain] : no joint pain [Joint Stiffness] : no joint stiffness [Muscle Pain] : no muscle pain [Back Pain] : no back pain [Itching] : no itching [Mole Changes] : no mole changes [Skin Rash] : no skin rash [Headache] : no headache [Dizziness] : no dizziness [Fainting] : no fainting [Unsteady Walk] : no ataxia [Insomnia] : no insomnia [Anxiety] : no anxiety [Depression] : no depression [Easy Bleeding] : no easy bleeding [Easy Bruising] : no easy bruising [Swollen Glands] : no swollen glands [FreeTextEntry3] : Pt is legally blind, [FreeTextEntry7] : Pt has 1 BM every 2 days,  [FreeTextEntry8] : Nocturia of 1 X per night,

## 2024-01-15 NOTE — ASSESSMENT
[FreeTextEntry1] : Pt continues to decline screening colonoscopy or Cologuard test.  He was reminded to have routine eye exam and dental care.

## 2024-01-15 NOTE — HEALTH RISK ASSESSMENT
[Very Good] : ~his/her~  mood as very good [Yes] : Yes [2 - 4 times a month (2 pts)] : 2-4 times a month (2 points) [1 or 2 (0 pts)] : 1 or 2 (0 points) [Never (0 pts)] : Never (0 points) [No] : In the past 12 months have you used drugs other than those required for medical reasons? No [No falls in past year] : Patient reported no falls in the past year [Little interest or pleasure doing things] : 1) Little interest or pleasure doing things [Feeling down, depressed, or hopeless] : 2) Feeling down, depressed, or hopeless [0] : 2) Feeling down, depressed, or hopeless: Not at all (0) [PHQ-2 Negative - No further assessment needed] : PHQ-2 Negative - No further assessment needed [Patient declined colonoscopy] : Patient declined colonoscopy [None] : None [With Family] : lives with family [# of Members in Household ___] :  household currently consist of [unfilled] member(s) [Retired] : retired [Graduate School] : graduate school [] :  [# Of Children ___] : has [unfilled] children [Feels Safe at Home] : Feels safe at home [Fully functional (bathing, dressing, toileting, transferring, walking, feeding)] : Fully functional (bathing, dressing, toileting, transferring, walking, feeding) [Reports changes in vision] : Reports changes in vision [Smoke Detector] : smoke detector [Carbon Monoxide Detector] : carbon monoxide detector [Seat Belt] :  uses seat belt [With Patient/Caregiver] : , with patient/caregiver [Relationship: ___] : Relationship: [unfilled] [Never] : Never [Audit-CScore] : 2 [de-identified] : exercises on the treadmill for 45 minutes 7 days per week [[id number]] : 0 [EyeExamDate] : 12/22 [Change in mental status noted] : No change in mental status noted [Reports changes in hearing] : Reports no changes in hearing [Reports changes in dental health] : Reports no changes in dental health [ColonoscopyDate] : Never [de-identified] : No driving or housework due to poor vision, 9/2020 [de-identified] : visual impair in L eye, legally blind [de-identified] : dentist > 5 years ago [AdvancecareDate] : 01/24

## 2024-01-15 NOTE — HISTORY OF PRESENT ILLNESS
[Spouse] : spouse [FreeTextEntry1] : Pt presented for PE.  Last PE was 1 year ago. [de-identified] : Pt had lithotripsy in 5/2023 and did well postop with no recurrence of renal colic.  He had COVID infection in 10/2022, it was mild.  He had a positive home test.  He recovered completely.   He had 5 doses of COVID vaccine. Pt had Flu vaccine at the pharmacy.  He feels well today w/o any new complaint.

## 2024-01-16 ENCOUNTER — RX RENEWAL (OUTPATIENT)
Age: 79
End: 2024-01-16

## 2024-03-11 ENCOUNTER — TRANSCRIPTION ENCOUNTER (OUTPATIENT)
Age: 79
End: 2024-03-11

## 2024-03-11 RX ORDER — RAMIPRIL 10 MG/1
10 CAPSULE ORAL
Qty: 90 | Refills: 3 | Status: ACTIVE | COMMUNITY
Start: 2022-11-17 | End: 1900-01-01

## 2024-03-11 RX ORDER — ROSUVASTATIN CALCIUM 5 MG/1
5 TABLET, FILM COATED ORAL
Qty: 90 | Refills: 3 | Status: ACTIVE | COMMUNITY
Start: 2020-10-06 | End: 1900-01-01

## 2024-03-11 RX ORDER — METOPROLOL TARTRATE 100 MG/1
100 TABLET, FILM COATED ORAL
Qty: 180 | Refills: 3 | Status: ACTIVE | COMMUNITY
Start: 2021-04-14 | End: 1900-01-01

## 2024-03-11 RX ORDER — METFORMIN ER 500 MG 500 MG/1
500 TABLET ORAL
Qty: 180 | Refills: 3 | Status: ACTIVE | COMMUNITY
Start: 2022-08-24 | End: 1900-01-01

## 2024-03-11 RX ORDER — NIFEDIPINE 90 MG/1
90 TABLET, EXTENDED RELEASE ORAL
Qty: 90 | Refills: 3 | Status: ACTIVE | COMMUNITY
Start: 2021-08-26 | End: 1900-01-01

## 2024-05-08 LAB
ALBUMIN SERPL ELPH-MCNC: 4.3 G/DL
ALP BLD-CCNC: 78 U/L
ALT SERPL-CCNC: 15 U/L
ANION GAP SERPL CALC-SCNC: 10 MMOL/L
AST SERPL-CCNC: 19 U/L
BILIRUB SERPL-MCNC: 0.3 MG/DL
BUN SERPL-MCNC: 29 MG/DL
CALCIUM SERPL-MCNC: 9.5 MG/DL
CHLORIDE SERPL-SCNC: 104 MMOL/L
CHOLEST SERPL-MCNC: 123 MG/DL
CK SERPL-CCNC: 76 U/L
CO2 SERPL-SCNC: 24 MMOL/L
CREAT SERPL-MCNC: 1.52 MG/DL
EGFR: 47 ML/MIN/1.73M2
ESTIMATED AVERAGE GLUCOSE: 131 MG/DL
GLUCOSE SERPL-MCNC: 121 MG/DL
HBA1C MFR BLD HPLC: 6.2 %
HDLC SERPL-MCNC: 65 MG/DL
LDLC SERPL CALC-MCNC: 44 MG/DL
NONHDLC SERPL-MCNC: 58 MG/DL
POTASSIUM SERPL-SCNC: 4.6 MMOL/L
PROT SERPL-MCNC: 6.6 G/DL
SODIUM SERPL-SCNC: 138 MMOL/L
T4 FREE SERPL-MCNC: 1.4 NG/DL
TRIGL SERPL-MCNC: 68 MG/DL
TSH SERPL-ACNC: 2.48 UIU/ML

## 2024-05-16 ENCOUNTER — APPOINTMENT (OUTPATIENT)
Dept: INTERNAL MEDICINE | Facility: CLINIC | Age: 79
End: 2024-05-16
Payer: MEDICARE

## 2024-05-16 VITALS
RESPIRATION RATE: 16 BRPM | HEART RATE: 51 BPM | HEIGHT: 64 IN | DIASTOLIC BLOOD PRESSURE: 75 MMHG | WEIGHT: 118 LBS | BODY MASS INDEX: 20.14 KG/M2 | TEMPERATURE: 97.7 F | SYSTOLIC BLOOD PRESSURE: 146 MMHG | OXYGEN SATURATION: 96 %

## 2024-05-16 VITALS — DIASTOLIC BLOOD PRESSURE: 72 MMHG | SYSTOLIC BLOOD PRESSURE: 132 MMHG

## 2024-05-16 DIAGNOSIS — R79.89 OTHER SPECIFIED ABNORMAL FINDINGS OF BLOOD CHEMISTRY: ICD-10-CM

## 2024-05-16 DIAGNOSIS — I10 ESSENTIAL (PRIMARY) HYPERTENSION: ICD-10-CM

## 2024-05-16 DIAGNOSIS — E03.9 HYPOTHYROIDISM, UNSPECIFIED: ICD-10-CM

## 2024-05-16 DIAGNOSIS — E11.9 TYPE 2 DIABETES MELLITUS W/OUT COMPLICATIONS: ICD-10-CM

## 2024-05-16 DIAGNOSIS — E78.5 HYPERLIPIDEMIA, UNSPECIFIED: ICD-10-CM

## 2024-05-16 PROCEDURE — G2211 COMPLEX E/M VISIT ADD ON: CPT

## 2024-05-16 PROCEDURE — 99214 OFFICE O/P EST MOD 30 MIN: CPT

## 2024-05-16 NOTE — PHYSICAL EXAM
[No Acute Distress] : no acute distress [Well Nourished] : well nourished [Well Developed] : well developed [Supple] : supple [No Respiratory Distress] : no respiratory distress  [Clear to Auscultation] : lungs were clear to auscultation bilaterally [Normal Rate] : normal rate  [Regular Rhythm] : with a regular rhythm [Normal S1, S2] : normal S1 and S2 [No Edema] : there was no peripheral edema [Soft] : abdomen soft [Non Tender] : non-tender [Normal Bowel Sounds] : normal bowel sounds [No CVA Tenderness] : no CVA  tenderness [No Spinal Tenderness] : no spinal tenderness [No Joint Swelling] : no joint swelling [Grossly Normal Strength/Tone] : grossly normal strength/tone [Normal Gait] : normal gait [Speech Grossly Normal] : speech grossly normal [Normal Affect] : the affect was normal [Alert and Oriented x3] : oriented to person, place, and time [de-identified] : male in stated age,

## 2024-05-16 NOTE — HISTORY OF PRESENT ILLNESS
[FreeTextEntry1] : Pt presented for 4 month follow up of HTN, HLD and hypothyroidism. [de-identified] : Pt feels well w/o any new complaint.  His health was uneventful and has not been sick since last OV.

## 2024-06-02 ENCOUNTER — RX RENEWAL (OUTPATIENT)
Age: 79
End: 2024-06-02

## 2024-06-02 RX ORDER — SPIRONOLACTONE 25 MG/1
25 TABLET ORAL
Qty: 90 | Refills: 1 | Status: ACTIVE | COMMUNITY
Start: 2024-01-15 | End: 1900-01-01

## 2024-06-21 LAB
ANION GAP SERPL CALC-SCNC: 13 MMOL/L
APPEARANCE: CLEAR
BACTERIA UR CULT: NORMAL
BACTERIA: NEGATIVE /HPF
BILIRUBIN URINE: NEGATIVE
BLOOD URINE: NEGATIVE
BUN SERPL-MCNC: 34 MG/DL
CALCIUM SERPL-MCNC: 9.9 MG/DL
CAST: 0 /LPF
CHLORIDE SERPL-SCNC: 106 MMOL/L
CO2 SERPL-SCNC: 22 MMOL/L
COLOR: YELLOW
CREAT SERPL-MCNC: 1.57 MG/DL
EGFR: 45 ML/MIN/1.73M2
EPITHELIAL CELLS: 1 /HPF
GLUCOSE QUALITATIVE U: NEGATIVE MG/DL
GLUCOSE SERPL-MCNC: 136 MG/DL
KETONES URINE: NEGATIVE MG/DL
LEUKOCYTE ESTERASE URINE: NEGATIVE
MICROSCOPIC-UA: NORMAL
NITRITE URINE: NEGATIVE
PH URINE: 5.5
POTASSIUM SERPL-SCNC: 5.4 MMOL/L
PROTEIN URINE: NEGATIVE MG/DL
PSA FREE FLD-MCNC: 17 %
PSA FREE SERPL-MCNC: 3.23 NG/ML
PSA SERPL-MCNC: 19.2 NG/ML
RED BLOOD CELLS URINE: 0 /HPF
SODIUM SERPL-SCNC: 141 MMOL/L
SPECIFIC GRAVITY URINE: 1.02
URINE CYTOLOGY: NORMAL
UROBILINOGEN URINE: 0.2 MG/DL
WHITE BLOOD CELLS URINE: 0 /HPF

## 2024-06-26 DIAGNOSIS — N20.0 CALCULUS OF KIDNEY: ICD-10-CM

## 2024-06-26 DIAGNOSIS — R97.20 ELEVATED PROSTATE, SPECIFIC ANTIGEN [PSA]: ICD-10-CM

## 2024-06-27 ENCOUNTER — APPOINTMENT (OUTPATIENT)
Dept: UROLOGY | Facility: CLINIC | Age: 79
End: 2024-06-27
Payer: MEDICARE

## 2024-06-27 DIAGNOSIS — C61 MALIGNANT NEOPLASM OF PROSTATE: ICD-10-CM

## 2024-06-27 PROCEDURE — G2211 COMPLEX E/M VISIT ADD ON: CPT

## 2024-06-27 PROCEDURE — 99214 OFFICE O/P EST MOD 30 MIN: CPT

## 2024-07-05 ENCOUNTER — RX RENEWAL (OUTPATIENT)
Age: 79
End: 2024-07-05

## 2024-07-12 NOTE — PROGRESS NOTE ADULT - PROBLEM/PLAN-5
Called to confirm appt.     Patient states her MRI is not until 08/05. Do you still want to see her or should she reschedule for AFTER her MRI  
DISPLAY PLAN FREE TEXT

## 2024-08-22 ENCOUNTER — APPOINTMENT (OUTPATIENT)
Dept: MRI IMAGING | Facility: IMAGING CENTER | Age: 79
End: 2024-08-22

## 2024-08-22 PROCEDURE — 76498P: CUSTOM | Mod: 26

## 2024-08-22 PROCEDURE — 72197 MRI PELVIS W/O & W/DYE: CPT | Mod: 26

## 2024-09-06 ENCOUNTER — OUTPATIENT (OUTPATIENT)
Dept: OUTPATIENT SERVICES | Facility: HOSPITAL | Age: 79
LOS: 1 days | End: 2024-09-06
Payer: MEDICARE

## 2024-09-06 DIAGNOSIS — R97.20 ELEVATED PROSTATE SPECIFIC ANTIGEN [PSA]: ICD-10-CM

## 2024-09-06 PROCEDURE — C8001: CPT

## 2024-09-06 PROCEDURE — 76377 3D RENDER W/INTRP POSTPROCES: CPT | Mod: 26

## 2024-09-10 ENCOUNTER — NON-APPOINTMENT (OUTPATIENT)
Age: 79
End: 2024-09-10

## 2024-09-23 ENCOUNTER — APPOINTMENT (OUTPATIENT)
Dept: UROLOGY | Facility: CLINIC | Age: 79
End: 2024-09-23
Payer: MEDICARE

## 2024-09-23 ENCOUNTER — OUTPATIENT (OUTPATIENT)
Dept: OUTPATIENT SERVICES | Facility: HOSPITAL | Age: 79
LOS: 1 days | End: 2024-09-23
Payer: MEDICARE

## 2024-09-23 VITALS
SYSTOLIC BLOOD PRESSURE: 131 MMHG | RESPIRATION RATE: 16 BRPM | HEIGHT: 64 IN | BODY MASS INDEX: 20.14 KG/M2 | WEIGHT: 118 LBS | OXYGEN SATURATION: 100 % | HEART RATE: 58 BPM | TEMPERATURE: 97.5 F | DIASTOLIC BLOOD PRESSURE: 70 MMHG

## 2024-09-23 DIAGNOSIS — R35.0 FREQUENCY OF MICTURITION: ICD-10-CM

## 2024-09-23 PROCEDURE — 55700: CPT

## 2024-09-23 PROCEDURE — 76999 ECHO EXAMINATION PROCEDURE: CPT

## 2024-09-23 PROCEDURE — 76999F: CUSTOM | Mod: 26

## 2024-09-24 ENCOUNTER — NON-APPOINTMENT (OUTPATIENT)
Age: 79
End: 2024-09-24

## 2024-09-24 DIAGNOSIS — E11.9 TYPE 2 DIABETES MELLITUS WITHOUT COMPLICATIONS: ICD-10-CM

## 2024-09-24 DIAGNOSIS — C61 MALIGNANT NEOPLASM OF PROSTATE: ICD-10-CM

## 2024-09-30 ENCOUNTER — NON-APPOINTMENT (OUTPATIENT)
Age: 79
End: 2024-09-30

## 2024-09-30 ENCOUNTER — APPOINTMENT (OUTPATIENT)
Dept: UROLOGY | Facility: CLINIC | Age: 79
End: 2024-09-30
Payer: MEDICARE

## 2024-09-30 DIAGNOSIS — C61 MALIGNANT NEOPLASM OF PROSTATE: ICD-10-CM

## 2024-09-30 PROCEDURE — 99214 OFFICE O/P EST MOD 30 MIN: CPT

## 2024-09-30 PROCEDURE — G2211 COMPLEX E/M VISIT ADD ON: CPT

## 2024-09-30 NOTE — HISTORY OF PRESENT ILLNESS
[FreeTextEntry1] : Follow-up for prostate cancer, on active surveillance because patient declined any intervention, repeated MRI done on 9/6/2024 and repeat biopsy done on 9/23/2024, patient is here to review result and discuss plan moving forward. Denied any urological issue  Biopsy demonstrated evidence of Anibal 7 and Anibal 6 prostate cancer involving 6 cores.  Given his rising PSA 19.2 patient obtain PET/CT.  I strongly encourage patient to see Dr. Powell regarding radiation therapy. Patient promised to make followup appointment. The patient will followup as directed and call me with any questions or concerns.   Please refer to URO Consult Note.

## 2024-09-30 NOTE — ADDENDUM
[FreeTextEntry1] : Entered by Patrick Monteiro, acting as scribe for Dr. Omar Flores. The documentation recorded by the scribe accurately reflects the service I personally performed and the decisions made by me.

## 2024-09-30 NOTE — LETTER BODY
[FreeTextEntry1] : Emily Tuttle MD [address], [address], [place], [place] [phone]   Dear Dr. Tuttle,      Reason for visit: Prostate cancer on active surveillance. Kidney stones.     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 Patient had kidney stone surgery in June 2023. Patient's previous prostate biopsy demonstrated [place] 7 prostate cancer involving 2 of 15 cores. His PSA was 16. He previously had hematuria. His previous CT scan demonstrated two large kidney stones, sized 1.4 cm and 9 mm. his KUB X ray demonstrated significant improvement in stone burden. He saw Dr. Powell previously and declined radiation therapy. His ultrasound in August 2023 demonstrated non obstructing punctuate stones in the left kidney. Patient returns today for follow-up. Since he was last seen, the patient obtained repeat prostate MRI and repeat prostate biopsy. Patient is here to review results. He denies any side effects or difficulties from the prostate biopsy. Patient has rising PSA 19.2. He denies any fevers or chills. He denies any pain. Patient denies any changes in health. The past medical history and family history and social history are unchanged. All other review of systems are negative. Patient denies any changes in medications. Medication list was reconciled.     On examination, the patient is a healthy-appearing gentleman in no acute distress. He is alert and oriented follows commands. He has normal mood and affect. He is normocephalic. Neck is supple. Oral no thrush Respirations are unlabored. His abdomen is soft and nontender. Bladder is nonpalpable. No CVA tenderness. Neurologically he is grossly intact. No peripheral edema. Skin without gross abnormality. He has normal male external genitalia. Normal meatus. Bilateral testes are descended intrascrotally and normal to palpation. On rectal examination, there is normal sphincter tone. The prostate is clinically benign without focal induration or nodularity.     Prostate biopsy demonstrated evidence of [place] 7 and Anibal 6 prostate cancer involving 6 cores.     Assessment:  Prostate cancer. Kidney stone. Hematuria.     I counseled the patient on its clinical significance.  Patient has progression of his prostate cancer.  His PSA is 19.2.  I discussed the risk of metastatic disease and the probability of organ confined disease. I discussed the treatment options available to him including expectant management, hormonal therapy, radiation, and surgery. The risks and benefits of each options were discussed in detail as well quality of life issues. Patient has Anibal 7 and 6 prostate cancer. His PSA is now 19.2. Given his rising PSA, I recommended the patient obtain PET/CT scan to ensure stability. I counseled the patient regarding the procedure. The patient will take the necessary preparations for the procedure. I strongly encouraged the patient to see Dr. Powell regarding radiation therapy. Patient promised to make a follow-up appointment. The patient will follow-up as directed and will contact me with any questions or concerns or he has made a decision regarding the treatment of his prostate cancer. Thank you for the opportunity to participate in the care of this patient, I will keep you updated on his progress.     Plan: PET/CT scan. See Dr. Powell for radiation therapy.. Follow-up as directed.  I spent 30-minutes time today on all issues related to this patient on today date of service including non face to face time.

## 2024-10-17 ENCOUNTER — APPOINTMENT (OUTPATIENT)
Dept: NUCLEAR MEDICINE | Facility: IMAGING CENTER | Age: 79
End: 2024-10-17

## 2024-10-17 ENCOUNTER — OUTPATIENT (OUTPATIENT)
Dept: OUTPATIENT SERVICES | Facility: HOSPITAL | Age: 79
LOS: 1 days | End: 2024-10-17
Payer: MEDICARE

## 2024-10-17 DIAGNOSIS — Z00.8 ENCOUNTER FOR OTHER GENERAL EXAMINATION: ICD-10-CM

## 2024-10-17 DIAGNOSIS — C61 MALIGNANT NEOPLASM OF PROSTATE: ICD-10-CM

## 2024-10-17 PROCEDURE — 78816 PET IMAGE W/CT FULL BODY: CPT | Mod: 26

## 2024-10-17 PROCEDURE — 78816 PET IMAGE W/CT FULL BODY: CPT

## 2024-10-17 PROCEDURE — A9595: CPT

## 2024-10-19 DIAGNOSIS — R94.2 ABNORMAL RESULTS OF PULMONARY FUNCTION STUDIES: ICD-10-CM

## 2024-10-21 PROBLEM — Z87.442 HISTORY OF KIDNEY STONES: Status: RESOLVED | Noted: 2024-10-21 | Resolved: 2024-10-21

## 2024-10-22 ENCOUNTER — NON-APPOINTMENT (OUTPATIENT)
Age: 79
End: 2024-10-22

## 2024-10-28 ENCOUNTER — RX RENEWAL (OUTPATIENT)
Age: 79
End: 2024-10-28

## 2024-10-29 ENCOUNTER — APPOINTMENT (OUTPATIENT)
Dept: RADIATION ONCOLOGY | Facility: CLINIC | Age: 79
End: 2024-10-29
Payer: MEDICARE

## 2024-10-29 ENCOUNTER — NON-APPOINTMENT (OUTPATIENT)
Age: 79
End: 2024-10-29

## 2024-10-29 VITALS
OXYGEN SATURATION: 97 % | WEIGHT: 123.46 LBS | RESPIRATION RATE: 16 BRPM | BODY MASS INDEX: 21.08 KG/M2 | HEART RATE: 54 BPM | HEIGHT: 64 IN | DIASTOLIC BLOOD PRESSURE: 69 MMHG | SYSTOLIC BLOOD PRESSURE: 126 MMHG

## 2024-10-29 DIAGNOSIS — Z80.8 FAMILY HISTORY OF MALIGNANT NEOPLASM OF OTHER ORGANS OR SYSTEMS: ICD-10-CM

## 2024-10-29 DIAGNOSIS — Z87.442 PERSONAL HISTORY OF URINARY CALCULI: ICD-10-CM

## 2024-10-29 PROCEDURE — 99214 OFFICE O/P EST MOD 30 MIN: CPT

## 2024-10-29 PROCEDURE — G2211 COMPLEX E/M VISIT ADD ON: CPT

## 2024-11-22 LAB
ALBUMIN SERPL ELPH-MCNC: 4.5 G/DL
ALP BLD-CCNC: 82 U/L
ALT SERPL-CCNC: 20 U/L
ANION GAP SERPL CALC-SCNC: 12 MMOL/L
AST SERPL-CCNC: 23 U/L
BILIRUB SERPL-MCNC: 0.3 MG/DL
BUN SERPL-MCNC: 36 MG/DL
CALCIUM SERPL-MCNC: 9.7 MG/DL
CHLORIDE SERPL-SCNC: 104 MMOL/L
CHOLEST SERPL-MCNC: 136 MG/DL
CO2 SERPL-SCNC: 22 MMOL/L
CREAT SERPL-MCNC: 1.51 MG/DL
EGFR: 47 ML/MIN/1.73M2
ESTIMATED AVERAGE GLUCOSE: 137 MG/DL
GLUCOSE SERPL-MCNC: 137 MG/DL
HBA1C MFR BLD HPLC: 6.4 %
HDLC SERPL-MCNC: 69 MG/DL
LDLC SERPL CALC-MCNC: 52 MG/DL
NONHDLC SERPL-MCNC: 67 MG/DL
POTASSIUM SERPL-SCNC: 5.3 MMOL/L
PROT SERPL-MCNC: 7.2 G/DL
SODIUM SERPL-SCNC: 139 MMOL/L
T4 FREE SERPL-MCNC: 1.4 NG/DL
TRIGL SERPL-MCNC: 79 MG/DL
TSH SERPL-ACNC: 3.47 UIU/ML

## 2024-12-05 ENCOUNTER — APPOINTMENT (OUTPATIENT)
Dept: INTERNAL MEDICINE | Facility: CLINIC | Age: 79
End: 2024-12-05
Payer: MEDICARE

## 2024-12-05 VITALS
OXYGEN SATURATION: 99 % | BODY MASS INDEX: 21.51 KG/M2 | DIASTOLIC BLOOD PRESSURE: 65 MMHG | TEMPERATURE: 97.3 F | HEIGHT: 64 IN | WEIGHT: 126 LBS | SYSTOLIC BLOOD PRESSURE: 137 MMHG | HEART RATE: 59 BPM

## 2024-12-05 DIAGNOSIS — E11.9 TYPE 2 DIABETES MELLITUS W/OUT COMPLICATIONS: ICD-10-CM

## 2024-12-05 DIAGNOSIS — E78.5 HYPERLIPIDEMIA, UNSPECIFIED: ICD-10-CM

## 2024-12-05 DIAGNOSIS — E03.9 HYPOTHYROIDISM, UNSPECIFIED: ICD-10-CM

## 2024-12-05 DIAGNOSIS — I10 ESSENTIAL (PRIMARY) HYPERTENSION: ICD-10-CM

## 2024-12-05 DIAGNOSIS — C61 MALIGNANT NEOPLASM OF PROSTATE: ICD-10-CM

## 2024-12-05 PROCEDURE — 99214 OFFICE O/P EST MOD 30 MIN: CPT

## 2024-12-05 PROCEDURE — G2211 COMPLEX E/M VISIT ADD ON: CPT

## 2024-12-27 ENCOUNTER — RX RENEWAL (OUTPATIENT)
Age: 79
End: 2024-12-27

## 2025-01-16 NOTE — H&P ADULT - NEGATIVE GENERAL GENITOURINARY SYMPTOMS
Detail Level: Detailed Detail Level: Generalized Detail Level: Simple no dysuria/normal urinary frequency/no hematuria

## 2025-01-17 ENCOUNTER — RX RENEWAL (OUTPATIENT)
Age: 80
End: 2025-01-17

## 2025-02-11 NOTE — DISCHARGE NOTE NURSING/CASE MANAGEMENT/SOCIAL WORK - NSDCFUADDAPPT_GEN_ALL_CORE_FT
144 The [facility] medical clinic was emailed and will call you with an appointment. If you do not receive a phone call from the medical clinic within 1 week of discharge.  please call [phone].

## 2025-02-17 DIAGNOSIS — N18.30 CHRONIC KIDNEY DISEASE, STAGE 3 UNSPECIFIED: ICD-10-CM

## 2025-02-26 ENCOUNTER — APPOINTMENT (OUTPATIENT)
Dept: RADIATION ONCOLOGY | Facility: CLINIC | Age: 80
End: 2025-02-26
Payer: MEDICARE

## 2025-02-26 ENCOUNTER — NON-APPOINTMENT (OUTPATIENT)
Age: 80
End: 2025-02-26

## 2025-02-26 VITALS
OXYGEN SATURATION: 100 % | WEIGHT: 129.38 LBS | HEIGHT: 64 IN | BODY MASS INDEX: 22.09 KG/M2 | RESPIRATION RATE: 18 BRPM | HEART RATE: 55 BPM | SYSTOLIC BLOOD PRESSURE: 122 MMHG | DIASTOLIC BLOOD PRESSURE: 62 MMHG | TEMPERATURE: 96.6 F

## 2025-02-26 PROCEDURE — 99215 OFFICE O/P EST HI 40 MIN: CPT | Mod: GC

## 2025-02-26 RX ORDER — SODIUM PHOSPHATE, DIBASIC AND SODIUM PHOSPHATE, MONOBASIC 7; 19 G/230ML; G/230ML
ENEMA RECTAL
Qty: 1 | Refills: 0 | Status: ACTIVE | COMMUNITY
Start: 2025-02-26 | End: 1900-01-01

## 2025-02-26 RX ORDER — AMOXICILLIN AND CLAVULANATE POTASSIUM 875; 125 MG/1; MG/1
875-125 TABLET, COATED ORAL
Qty: 6 | Refills: 0 | Status: ACTIVE | COMMUNITY
Start: 2025-02-26 | End: 1900-01-01

## 2025-02-26 RX ADMIN — ACETAMINOPHEN 0 MG: 325 TABLET ORAL at 00:00

## 2025-02-26 RX ADMIN — LORAZEPAM 0 MG: 0.5 TABLET ORAL at 00:00

## 2025-02-26 RX ADMIN — LIDOCAINE AND PRILOCAINE 0 %: 25; 25 CREAM TOPICAL at 00:00

## 2025-02-27 ENCOUNTER — OUTPATIENT (OUTPATIENT)
Dept: OUTPATIENT SERVICES | Facility: HOSPITAL | Age: 80
LOS: 1 days | Discharge: HOME | End: 2025-02-27
Payer: MEDICARE

## 2025-03-04 ENCOUNTER — NON-APPOINTMENT (OUTPATIENT)
Age: 80
End: 2025-03-04

## 2025-03-05 DIAGNOSIS — D72.820 LYMPHOCYTOSIS (SYMPTOMATIC): ICD-10-CM

## 2025-03-07 ENCOUNTER — NON-APPOINTMENT (OUTPATIENT)
Age: 80
End: 2025-03-07

## 2025-03-07 DIAGNOSIS — R11.0 NAUSEA: ICD-10-CM

## 2025-03-07 PROCEDURE — 55874 TPRNL PLMT BIODEGRDABL MATRL: CPT

## 2025-03-07 PROCEDURE — 76872 US TRANSRECTAL: CPT | Mod: 26

## 2025-03-07 RX ORDER — LIDOCAINE AND PRILOCAINE 25; 25 MG/G; MG/G
2.5-2.5 CREAM TOPICAL
Qty: 0 | Refills: 0 | Status: COMPLETED | OUTPATIENT
Start: 2025-02-26

## 2025-03-07 RX ORDER — ONDANSETRON 4 MG/1
4 TABLET, ORALLY DISINTEGRATING ORAL
Qty: 0 | Refills: 0 | Status: COMPLETED | OUTPATIENT
Start: 2025-03-07

## 2025-03-07 RX ORDER — ACETAMINOPHEN 325 MG/1
325 TABLET ORAL
Qty: 0 | Refills: 0 | Status: COMPLETED | OUTPATIENT
Start: 2025-02-26

## 2025-03-10 ENCOUNTER — NON-APPOINTMENT (OUTPATIENT)
Age: 80
End: 2025-03-10

## 2025-03-14 PROCEDURE — 77334 RADIATION TREATMENT AID(S): CPT | Mod: 26

## 2025-03-14 PROCEDURE — 77263 THER RADIOLOGY TX PLNG CPLX: CPT

## 2025-03-21 PROCEDURE — 77300 RADIATION THERAPY DOSE PLAN: CPT | Mod: 26

## 2025-03-21 PROCEDURE — 77301 RADIOTHERAPY DOSE PLAN IMRT: CPT | Mod: 26

## 2025-03-21 PROCEDURE — 77338 DESIGN MLC DEVICE FOR IMRT: CPT | Mod: 26

## 2025-03-31 ENCOUNTER — APPOINTMENT (OUTPATIENT)
Dept: INTERNAL MEDICINE | Facility: CLINIC | Age: 80
End: 2025-03-31
Payer: MEDICARE

## 2025-03-31 VITALS
HEART RATE: 52 BPM | OXYGEN SATURATION: 98 % | HEIGHT: 64 IN | WEIGHT: 124 LBS | BODY MASS INDEX: 21.17 KG/M2 | TEMPERATURE: 98 F | DIASTOLIC BLOOD PRESSURE: 72 MMHG | SYSTOLIC BLOOD PRESSURE: 128 MMHG

## 2025-03-31 VITALS — DIASTOLIC BLOOD PRESSURE: 62 MMHG | SYSTOLIC BLOOD PRESSURE: 112 MMHG

## 2025-03-31 DIAGNOSIS — D64.9 ANEMIA, UNSPECIFIED: ICD-10-CM

## 2025-03-31 DIAGNOSIS — E78.5 HYPERLIPIDEMIA, UNSPECIFIED: ICD-10-CM

## 2025-03-31 DIAGNOSIS — E03.9 HYPOTHYROIDISM, UNSPECIFIED: ICD-10-CM

## 2025-03-31 DIAGNOSIS — R94.2 ABNORMAL RESULTS OF PULMONARY FUNCTION STUDIES: ICD-10-CM

## 2025-03-31 DIAGNOSIS — N18.30 CHRONIC KIDNEY DISEASE, STAGE 3 UNSPECIFIED: ICD-10-CM

## 2025-03-31 DIAGNOSIS — E11.9 TYPE 2 DIABETES MELLITUS W/OUT COMPLICATIONS: ICD-10-CM

## 2025-03-31 DIAGNOSIS — Z00.00 ENCOUNTER FOR GENERAL ADULT MEDICAL EXAMINATION W/OUT ABNORMAL FINDINGS: ICD-10-CM

## 2025-03-31 DIAGNOSIS — D72.820 LYMPHOCYTOSIS (SYMPTOMATIC): ICD-10-CM

## 2025-03-31 DIAGNOSIS — C61 MALIGNANT NEOPLASM OF PROSTATE: ICD-10-CM

## 2025-03-31 DIAGNOSIS — I10 ESSENTIAL (PRIMARY) HYPERTENSION: ICD-10-CM

## 2025-03-31 DIAGNOSIS — G25.0 ESSENTIAL TREMOR: ICD-10-CM

## 2025-03-31 PROCEDURE — 99214 OFFICE O/P EST MOD 30 MIN: CPT | Mod: 25

## 2025-03-31 PROCEDURE — G2211 COMPLEX E/M VISIT ADD ON: CPT

## 2025-03-31 PROCEDURE — G0439: CPT

## 2025-03-31 PROCEDURE — 82270 OCCULT BLOOD FECES: CPT

## 2025-04-04 PROBLEM — Z92.3 HISTORY OF RADIATION THERAPY: Status: RESOLVED | Noted: 2025-04-04 | Resolved: 2025-04-04

## 2025-04-07 ENCOUNTER — NON-APPOINTMENT (OUTPATIENT)
Age: 80
End: 2025-04-07

## 2025-04-07 DIAGNOSIS — Z92.3 PERSONAL HISTORY OF IRRADIATION: ICD-10-CM

## 2025-04-07 RX ORDER — TAMSULOSIN HYDROCHLORIDE 0.4 MG/1
0.4 CAPSULE ORAL
Qty: 90 | Refills: 3 | Status: ACTIVE | COMMUNITY
Start: 2025-04-07 | End: 1900-01-01

## 2025-04-11 PROCEDURE — 77435 SBRT MANAGEMENT: CPT

## 2025-04-24 ENCOUNTER — RX RENEWAL (OUTPATIENT)
Age: 80
End: 2025-04-24

## 2025-04-28 ENCOUNTER — NON-APPOINTMENT (OUTPATIENT)
Age: 80
End: 2025-04-28

## 2025-05-20 ENCOUNTER — APPOINTMENT (OUTPATIENT)
Dept: RADIATION ONCOLOGY | Facility: CLINIC | Age: 80
End: 2025-05-20
Payer: MEDICARE

## 2025-05-20 VITALS
HEIGHT: 64 IN | TEMPERATURE: 96.98 F | SYSTOLIC BLOOD PRESSURE: 115 MMHG | RESPIRATION RATE: 16 BRPM | DIASTOLIC BLOOD PRESSURE: 67 MMHG | OXYGEN SATURATION: 99 % | HEART RATE: 56 BPM

## 2025-05-20 PROCEDURE — 99214 OFFICE O/P EST MOD 30 MIN: CPT

## 2025-05-21 ENCOUNTER — NON-APPOINTMENT (OUTPATIENT)
Age: 80
End: 2025-05-21

## 2025-05-27 LAB — PSA SERPL-MCNC: 8.04 NG/ML

## 2025-05-29 NOTE — ASU PATIENT PROFILE, ADULT - BRADEN SCORE (IF 18 OR LESS ACTIVATE SKIN INJURY RISK INCREASED GUIDELINE), MLM
[facility]  met with patient and spouse at bedside. Reviewed [facility] program, acute rehab criteria and approval process, insurance authorization process, as well as [facility] locations and preferences. Patient's preferred [facility] location is [facility] and may consider second choice as [facility] after confirming with children in the event that no [facility] bed available.  [facility] Rehab folder left with patient and all questions answered. Patient was made aware that [facility] Reviewer will keep their  updated regarding referral status.    22

## 2025-06-10 ENCOUNTER — TRANSCRIPTION ENCOUNTER (OUTPATIENT)
Age: 80
End: 2025-06-10

## 2025-08-12 ENCOUNTER — RX RENEWAL (OUTPATIENT)
Age: 80
End: 2025-08-12

## (undated) DEVICE — IRR BULB PATHFINDER + 10"

## (undated) DEVICE — POSITIONER FOAM EGG CRATE ULNAR 2PCS (PINK)

## (undated) DEVICE — SOL IRR POUR H2O 1500ML

## (undated) DEVICE — POSITIONER FOAM HEADREST (PINK)

## (undated) DEVICE — BLADDER EVACUATOR ELLIK DISP STERILE

## (undated) DEVICE — Device

## (undated) DEVICE — DRAPE EQUIPMENT COVER 27"

## (undated) DEVICE — PRESSURE INFUSOR BAG 3000ML

## (undated) DEVICE — GLV 6.5 PROTEXIS (WHITE)

## (undated) DEVICE — SYR ASEPTO

## (undated) DEVICE — WARMING BLANKET UPPER ADULT

## (undated) DEVICE — FOLEY TRAY 16FR 5CC LTX UMETER CLOSED

## (undated) DEVICE — SOL IRR BAG H2O 3000ML

## (undated) DEVICE — GLV 8 PROTEXIS (WHITE)

## (undated) DEVICE — ACMI SELF-SEALING SEAL UP TO 7FR

## (undated) DEVICE — TUBING RANGER FLUID IRRIGATION SET DISP

## (undated) DEVICE — TUBING SUCTION 20FT

## (undated) DEVICE — GLV 7 PROTEXIS (WHITE)

## (undated) DEVICE — SOL IRR BAG NS 0.9% 3000ML

## (undated) DEVICE — SOL IRR POUR NS 0.9% 500ML

## (undated) DEVICE — GLV 7.5 PROTEXIS (WHITE)

## (undated) DEVICE — FOLEY HOLDER STATLOCK 2 WAY ADULT

## (undated) DEVICE — GOWN TRIMAX LG

## (undated) DEVICE — PACK CYSTO

## (undated) DEVICE — DRAPE EQUIPMENT BANDED BAG 30 X 30" (SHOWER CAP)

## (undated) DEVICE — VENODYNE/SCD SLEEVE CALF LARGE

## (undated) DEVICE — BOSTON SCIENTIFC ENCORE 26 INFLATOR

## (undated) DEVICE — ADAPTER CHECK FLO 9FR STERILE